# Patient Record
Sex: FEMALE | Race: BLACK OR AFRICAN AMERICAN | Employment: FULL TIME | URBAN - METROPOLITAN AREA
[De-identification: names, ages, dates, MRNs, and addresses within clinical notes are randomized per-mention and may not be internally consistent; named-entity substitution may affect disease eponyms.]

---

## 2017-02-03 ENCOUNTER — OFFICE VISIT (OUTPATIENT)
Dept: FAMILY MEDICINE CLINIC | Age: 52
End: 2017-02-03

## 2017-02-03 VITALS
RESPIRATION RATE: 18 BRPM | WEIGHT: 198.8 LBS | HEART RATE: 88 BPM | TEMPERATURE: 97.9 F | DIASTOLIC BLOOD PRESSURE: 70 MMHG | SYSTOLIC BLOOD PRESSURE: 110 MMHG | BODY MASS INDEX: 31.2 KG/M2 | HEIGHT: 67 IN

## 2017-02-03 DIAGNOSIS — J01.40 ACUTE NON-RECURRENT PANSINUSITIS: Primary | ICD-10-CM

## 2017-02-03 DIAGNOSIS — R05.9 COUGH: ICD-10-CM

## 2017-02-03 DIAGNOSIS — R60.9 PERIPHERAL EDEMA: ICD-10-CM

## 2017-02-03 PROCEDURE — 3017F COLORECTAL CA SCREEN DOC REV: CPT | Performed by: NURSE PRACTITIONER

## 2017-02-03 PROCEDURE — G8419 CALC BMI OUT NRM PARAM NOF/U: HCPCS | Performed by: NURSE PRACTITIONER

## 2017-02-03 PROCEDURE — G8427 DOCREV CUR MEDS BY ELIG CLIN: HCPCS | Performed by: NURSE PRACTITIONER

## 2017-02-03 PROCEDURE — 3014F SCREEN MAMMO DOC REV: CPT | Performed by: NURSE PRACTITIONER

## 2017-02-03 PROCEDURE — G8484 FLU IMMUNIZE NO ADMIN: HCPCS | Performed by: NURSE PRACTITIONER

## 2017-02-03 PROCEDURE — 99213 OFFICE O/P EST LOW 20 MIN: CPT | Performed by: NURSE PRACTITIONER

## 2017-02-03 PROCEDURE — 1036F TOBACCO NON-USER: CPT | Performed by: NURSE PRACTITIONER

## 2017-02-03 RX ORDER — CEFDINIR 300 MG/1
300 CAPSULE ORAL 2 TIMES DAILY
Qty: 20 CAPSULE | Refills: 0 | Status: SHIPPED | OUTPATIENT
Start: 2017-02-03 | End: 2017-02-13

## 2017-02-03 RX ORDER — BENZONATATE 100 MG/1
100 CAPSULE ORAL 3 TIMES DAILY PRN
Qty: 21 CAPSULE | Refills: 0 | Status: SHIPPED | OUTPATIENT
Start: 2017-02-03 | End: 2017-02-10

## 2017-02-03 ASSESSMENT — ENCOUNTER SYMPTOMS
SINUS PRESSURE: 1
ABDOMINAL DISTENTION: 0
SORE THROAT: 1
TROUBLE SWALLOWING: 0
SHORTNESS OF BREATH: 0
CHEST TIGHTNESS: 1
ANAL BLEEDING: 0
ABDOMINAL PAIN: 0
CONSTIPATION: 0
PHOTOPHOBIA: 0
BACK PAIN: 0
WHEEZING: 0
NAUSEA: 0
VOICE CHANGE: 1
COUGH: 1
DIARRHEA: 0
VOMITING: 0
APNEA: 0
BLOOD IN STOOL: 0

## 2017-02-03 ASSESSMENT — PATIENT HEALTH QUESTIONNAIRE - PHQ9
SUM OF ALL RESPONSES TO PHQ9 QUESTIONS 1 & 2: 1
1. LITTLE INTEREST OR PLEASURE IN DOING THINGS: 0
2. FEELING DOWN, DEPRESSED OR HOPELESS: 1
SUM OF ALL RESPONSES TO PHQ QUESTIONS 1-9: 1

## 2017-08-16 ENCOUNTER — TELEPHONE (OUTPATIENT)
Dept: FAMILY MEDICINE CLINIC | Age: 52
End: 2017-08-16

## 2017-08-16 DIAGNOSIS — Z12.31 ENCOUNTER FOR SCREENING MAMMOGRAM FOR MALIGNANT NEOPLASM OF BREAST: Primary | ICD-10-CM

## 2017-09-15 ENCOUNTER — HOSPITAL ENCOUNTER (OUTPATIENT)
Dept: WOMENS IMAGING | Age: 52
Discharge: HOME | End: 2017-09-15
Payer: COMMERCIAL

## 2017-09-15 DIAGNOSIS — Z12.31 ENCOUNTER FOR SCREENING MAMMOGRAM FOR MALIGNANT NEOPLASM OF BREAST: ICD-10-CM

## 2017-09-15 PROCEDURE — 77063 BREAST TOMOSYNTHESIS BI: CPT

## 2017-09-20 ENCOUNTER — HOSPITAL ENCOUNTER (OUTPATIENT)
Dept: WOMENS IMAGING | Age: 52
Discharge: HOME | End: 2017-09-20
Payer: COMMERCIAL

## 2017-09-20 DIAGNOSIS — R92.2 BREAST DENSITY: ICD-10-CM

## 2017-09-20 PROCEDURE — 76642 ULTRASOUND BREAST LIMITED: CPT

## 2017-09-22 ENCOUNTER — OFFICE VISIT (OUTPATIENT)
Dept: FAMILY MEDICINE CLINIC | Age: 52
End: 2017-09-22
Payer: COMMERCIAL

## 2017-09-22 VITALS
SYSTOLIC BLOOD PRESSURE: 122 MMHG | WEIGHT: 191.4 LBS | BODY MASS INDEX: 30.04 KG/M2 | HEART RATE: 84 BPM | RESPIRATION RATE: 18 BRPM | TEMPERATURE: 98.4 F | HEIGHT: 67 IN | DIASTOLIC BLOOD PRESSURE: 86 MMHG

## 2017-09-22 DIAGNOSIS — J01.40 ACUTE NON-RECURRENT PANSINUSITIS: Primary | ICD-10-CM

## 2017-09-22 DIAGNOSIS — J02.9 SORE THROAT: ICD-10-CM

## 2017-09-22 LAB — S PYO AG THROAT QL: NORMAL

## 2017-09-22 PROCEDURE — G8417 CALC BMI ABV UP PARAM F/U: HCPCS | Performed by: NURSE PRACTITIONER

## 2017-09-22 PROCEDURE — 1036F TOBACCO NON-USER: CPT | Performed by: NURSE PRACTITIONER

## 2017-09-22 PROCEDURE — 3014F SCREEN MAMMO DOC REV: CPT | Performed by: NURSE PRACTITIONER

## 2017-09-22 PROCEDURE — 87880 STREP A ASSAY W/OPTIC: CPT | Performed by: NURSE PRACTITIONER

## 2017-09-22 PROCEDURE — 3017F COLORECTAL CA SCREEN DOC REV: CPT | Performed by: NURSE PRACTITIONER

## 2017-09-22 PROCEDURE — 99213 OFFICE O/P EST LOW 20 MIN: CPT | Performed by: NURSE PRACTITIONER

## 2017-09-22 PROCEDURE — G8427 DOCREV CUR MEDS BY ELIG CLIN: HCPCS | Performed by: NURSE PRACTITIONER

## 2017-09-22 RX ORDER — CEFDINIR 300 MG/1
300 CAPSULE ORAL 2 TIMES DAILY
Qty: 28 CAPSULE | Refills: 0 | Status: SHIPPED | OUTPATIENT
Start: 2017-09-22 | End: 2017-10-06

## 2017-09-22 ASSESSMENT — ENCOUNTER SYMPTOMS
BLOOD IN STOOL: 0
COUGH: 1
ANAL BLEEDING: 0
PHOTOPHOBIA: 0
CHEST TIGHTNESS: 1
ABDOMINAL DISTENTION: 0
TROUBLE SWALLOWING: 0
APNEA: 0
WHEEZING: 0
CONSTIPATION: 0
NAUSEA: 0
VOMITING: 0
SHORTNESS OF BREATH: 0
VOICE CHANGE: 1
ABDOMINAL PAIN: 0
BACK PAIN: 0
RHINORRHEA: 0
DIARRHEA: 0
SORE THROAT: 1

## 2017-10-06 ENCOUNTER — HOSPITAL ENCOUNTER (OUTPATIENT)
Dept: WOMENS IMAGING | Age: 52
Discharge: HOME | End: 2017-10-06
Payer: COMMERCIAL

## 2017-10-06 DIAGNOSIS — N60.02 BREAST CYST, LEFT: ICD-10-CM

## 2017-10-06 PROCEDURE — 76942 ECHO GUIDE FOR BIOPSY: CPT

## 2017-10-06 NOTE — PROGRESS NOTES
Formulation and discussion of sedation / procedure plans, risks, benefits, side effects and alternatives with patient and/or responsible adult completed.     Electronically signed by Job Marrero MD on 10/6/2017 at 2:49 PM

## 2018-10-30 ENCOUNTER — TELEPHONE (OUTPATIENT)
Dept: FAMILY MEDICINE CLINIC | Age: 53
End: 2018-10-30

## 2018-10-30 DIAGNOSIS — M79.621 AXILLARY TENDERNESS, RIGHT: ICD-10-CM

## 2018-10-30 DIAGNOSIS — Z12.31 ENCOUNTER FOR SCREENING MAMMOGRAM FOR BREAST CANCER: Primary | ICD-10-CM

## 2018-11-13 DIAGNOSIS — M79.621 AXILLARY TENDERNESS, RIGHT: Primary | ICD-10-CM

## 2018-11-19 ENCOUNTER — HOSPITAL ENCOUNTER (OUTPATIENT)
Dept: WOMENS IMAGING | Age: 53
Discharge: HOME | End: 2018-11-19
Payer: COMMERCIAL

## 2018-11-19 DIAGNOSIS — Z12.31 ENCOUNTER FOR SCREENING MAMMOGRAM FOR BREAST CANCER: ICD-10-CM

## 2018-11-19 DIAGNOSIS — N64.4 BREAST PAIN: ICD-10-CM

## 2018-11-19 DIAGNOSIS — M79.621 AXILLARY TENDERNESS, RIGHT: ICD-10-CM

## 2018-11-19 PROCEDURE — 76642 ULTRASOUND BREAST LIMITED: CPT

## 2018-11-19 PROCEDURE — G0279 TOMOSYNTHESIS, MAMMO: HCPCS

## 2019-03-14 ENCOUNTER — TELEPHONE (OUTPATIENT)
Dept: FAMILY MEDICINE CLINIC | Age: 54
End: 2019-03-14

## 2019-03-15 ENCOUNTER — OFFICE VISIT (OUTPATIENT)
Dept: FAMILY MEDICINE CLINIC | Age: 54
End: 2019-03-15
Payer: COMMERCIAL

## 2019-03-15 VITALS
WEIGHT: 199.2 LBS | DIASTOLIC BLOOD PRESSURE: 90 MMHG | TEMPERATURE: 98 F | BODY MASS INDEX: 31.67 KG/M2 | RESPIRATION RATE: 16 BRPM | HEART RATE: 76 BPM | SYSTOLIC BLOOD PRESSURE: 136 MMHG

## 2019-03-15 DIAGNOSIS — B96.89 ACUTE BACTERIAL SINUSITIS: Primary | ICD-10-CM

## 2019-03-15 DIAGNOSIS — R05.9 COUGH: ICD-10-CM

## 2019-03-15 DIAGNOSIS — J01.90 ACUTE BACTERIAL SINUSITIS: Primary | ICD-10-CM

## 2019-03-15 PROCEDURE — 3017F COLORECTAL CA SCREEN DOC REV: CPT | Performed by: NURSE PRACTITIONER

## 2019-03-15 PROCEDURE — G8484 FLU IMMUNIZE NO ADMIN: HCPCS | Performed by: NURSE PRACTITIONER

## 2019-03-15 PROCEDURE — 1036F TOBACCO NON-USER: CPT | Performed by: NURSE PRACTITIONER

## 2019-03-15 PROCEDURE — 99213 OFFICE O/P EST LOW 20 MIN: CPT | Performed by: NURSE PRACTITIONER

## 2019-03-15 PROCEDURE — G8417 CALC BMI ABV UP PARAM F/U: HCPCS | Performed by: NURSE PRACTITIONER

## 2019-03-15 PROCEDURE — G8427 DOCREV CUR MEDS BY ELIG CLIN: HCPCS | Performed by: NURSE PRACTITIONER

## 2019-03-15 RX ORDER — CEFDINIR 300 MG/1
300 CAPSULE ORAL 2 TIMES DAILY
Qty: 20 CAPSULE | Refills: 0 | Status: SHIPPED | OUTPATIENT
Start: 2019-03-15 | End: 2019-03-25

## 2019-03-15 RX ORDER — BENZONATATE 100 MG/1
100 CAPSULE ORAL 3 TIMES DAILY PRN
Qty: 30 CAPSULE | Refills: 0 | Status: SHIPPED | OUTPATIENT
Start: 2019-03-15 | End: 2019-03-22

## 2019-03-15 ASSESSMENT — ENCOUNTER SYMPTOMS
COUGH: 1
WHEEZING: 0
SHORTNESS OF BREATH: 0
TROUBLE SWALLOWING: 0
RHINORRHEA: 1
EYE PAIN: 0
SINUS PRESSURE: 1
SORE THROAT: 1
SINUS PAIN: 1

## 2019-03-15 ASSESSMENT — PATIENT HEALTH QUESTIONNAIRE - PHQ9
SUM OF ALL RESPONSES TO PHQ QUESTIONS 1-9: 1
SUM OF ALL RESPONSES TO PHQ9 QUESTIONS 1 & 2: 1
SUM OF ALL RESPONSES TO PHQ QUESTIONS 1-9: 1
1. LITTLE INTEREST OR PLEASURE IN DOING THINGS: 0
2. FEELING DOWN, DEPRESSED OR HOPELESS: 1

## 2019-07-30 ENCOUNTER — HOSPITAL ENCOUNTER (OUTPATIENT)
Dept: WOMENS IMAGING | Age: 54
Discharge: HOME | End: 2019-07-30
Payer: COMMERCIAL

## 2019-07-30 DIAGNOSIS — Z09 FOLLOW UP: ICD-10-CM

## 2019-07-30 DIAGNOSIS — R59.0 ENLARGED LYMPH NODES IN ARMPIT: ICD-10-CM

## 2019-07-30 PROCEDURE — 76882 US LMTD JT/FCL EVL NVASC XTR: CPT

## 2019-12-30 ENCOUNTER — OFFICE VISIT (OUTPATIENT)
Dept: FAMILY MEDICINE CLINIC | Age: 54
End: 2019-12-30
Payer: COMMERCIAL

## 2019-12-30 VITALS
BODY MASS INDEX: 32.24 KG/M2 | DIASTOLIC BLOOD PRESSURE: 76 MMHG | HEIGHT: 66 IN | WEIGHT: 200.6 LBS | TEMPERATURE: 98.6 F | HEART RATE: 72 BPM | RESPIRATION RATE: 16 BRPM | SYSTOLIC BLOOD PRESSURE: 112 MMHG

## 2019-12-30 DIAGNOSIS — Z13.1 SCREENING FOR DIABETES MELLITUS: ICD-10-CM

## 2019-12-30 DIAGNOSIS — Z13.220 SCREENING, LIPID: ICD-10-CM

## 2019-12-30 DIAGNOSIS — R53.83 FATIGUE, UNSPECIFIED TYPE: ICD-10-CM

## 2019-12-30 DIAGNOSIS — L30.9 DERMATITIS: Primary | ICD-10-CM

## 2019-12-30 PROCEDURE — G8484 FLU IMMUNIZE NO ADMIN: HCPCS | Performed by: NURSE PRACTITIONER

## 2019-12-30 PROCEDURE — G8427 DOCREV CUR MEDS BY ELIG CLIN: HCPCS | Performed by: NURSE PRACTITIONER

## 2019-12-30 PROCEDURE — 1036F TOBACCO NON-USER: CPT | Performed by: NURSE PRACTITIONER

## 2019-12-30 PROCEDURE — G8417 CALC BMI ABV UP PARAM F/U: HCPCS | Performed by: NURSE PRACTITIONER

## 2019-12-30 PROCEDURE — 99213 OFFICE O/P EST LOW 20 MIN: CPT | Performed by: NURSE PRACTITIONER

## 2019-12-30 PROCEDURE — 3017F COLORECTAL CA SCREEN DOC REV: CPT | Performed by: NURSE PRACTITIONER

## 2019-12-30 RX ORDER — PREDNISONE 10 MG/1
TABLET ORAL
Qty: 30 TABLET | Refills: 0 | Status: SHIPPED | OUTPATIENT
Start: 2019-12-30 | End: 2020-01-09

## 2019-12-30 ASSESSMENT — ENCOUNTER SYMPTOMS
CONSTIPATION: 0
NAUSEA: 0
DIARRHEA: 0
BLOOD IN STOOL: 0
SHORTNESS OF BREATH: 0
VOMITING: 0

## 2020-06-23 ENCOUNTER — OFFICE VISIT (OUTPATIENT)
Dept: FAMILY MEDICINE CLINIC | Age: 55
End: 2020-06-23
Payer: COMMERCIAL

## 2020-06-23 VITALS
DIASTOLIC BLOOD PRESSURE: 86 MMHG | RESPIRATION RATE: 16 BRPM | BODY MASS INDEX: 33.09 KG/M2 | HEART RATE: 76 BPM | TEMPERATURE: 97.8 F | WEIGHT: 205 LBS | SYSTOLIC BLOOD PRESSURE: 146 MMHG

## 2020-06-23 PROCEDURE — G8427 DOCREV CUR MEDS BY ELIG CLIN: HCPCS | Performed by: NURSE PRACTITIONER

## 2020-06-23 PROCEDURE — 3017F COLORECTAL CA SCREEN DOC REV: CPT | Performed by: NURSE PRACTITIONER

## 2020-06-23 PROCEDURE — G8417 CALC BMI ABV UP PARAM F/U: HCPCS | Performed by: NURSE PRACTITIONER

## 2020-06-23 PROCEDURE — 99214 OFFICE O/P EST MOD 30 MIN: CPT | Performed by: NURSE PRACTITIONER

## 2020-06-23 PROCEDURE — 1036F TOBACCO NON-USER: CPT | Performed by: NURSE PRACTITIONER

## 2020-06-23 RX ORDER — FLUCONAZOLE 150 MG/1
TABLET ORAL
Qty: 2 TABLET | Refills: 0 | Status: SHIPPED | OUTPATIENT
Start: 2020-06-23 | End: 2020-06-24

## 2020-06-23 RX ORDER — AZITHROMYCIN 250 MG/1
250 TABLET, FILM COATED ORAL SEE ADMIN INSTRUCTIONS
Qty: 6 TABLET | Refills: 0 | Status: SHIPPED | OUTPATIENT
Start: 2020-06-23 | End: 2020-06-28

## 2020-06-23 ASSESSMENT — ENCOUNTER SYMPTOMS
COUGH: 1
SHORTNESS OF BREATH: 0
HOARSE VOICE: 1
RHINORRHEA: 1
SWOLLEN GLANDS: 0
SINUS PRESSURE: 1
TROUBLE SWALLOWING: 0
SORE THROAT: 0
WHEEZING: 0

## 2020-06-23 NOTE — PROGRESS NOTES
tablet, Rfl: 0    aspirin-acetaminophen-caffeine (EXCEDRIN MIGRAINE) 250-250-65 MG per tablet, Take 1 tablet by mouth every 6 hours as needed for Headaches, Disp: , Rfl:     acetaminophen 650 MG TABS, Take 650 mg by mouth every 4 hours as needed for Pain (For mild pain level 1-3 or for fever > 100.5). , Disp: 120 tablet, Rfl:     The patient is allergic to augmentin [amoxicillin-pot clavulanate] and sulfamethoxazole-trimethoprim. Past Medical History  Bozena Miller  has a past medical history of Allergic rhinitis, Anxiety, Migraine, and Root canal space perforation. Subjective:      Review of Systems   Constitutional: Negative for chills, diaphoresis and fever. HENT: Positive for congestion, hoarse voice, postnasal drip, rhinorrhea, sinus pressure and sneezing. Negative for sore throat and trouble swallowing. Respiratory: Positive for cough. Negative for shortness of breath and wheezing. Cardiovascular: Negative for chest pain and palpitations. Genitourinary: Negative for vaginal bleeding, vaginal discharge and vaginal pain. Musculoskeletal: Negative for neck pain. Neurological: Negative for dizziness and headaches. Objective:     BP (!) 146/86   Pulse 76   Temp 97.8 °F (36.6 °C)   Resp 16   Wt 205 lb (93 kg)   BMI 33.09 kg/m²     Physical Exam  Vitals signs reviewed. Constitutional:       General: She is not in acute distress. Appearance: Normal appearance. She is well-developed. HENT:      Head: Normocephalic and atraumatic. Right Ear: Hearing, tympanic membrane, ear canal and external ear normal.      Left Ear: Hearing, tympanic membrane, ear canal and external ear normal.      Nose: Congestion and rhinorrhea present. No nasal tenderness. Right Sinus: Maxillary sinus tenderness and frontal sinus tenderness present. Left Sinus: Maxillary sinus tenderness and frontal sinus tenderness present. Mouth/Throat:      Lips: Pink.       Mouth: Mucous membranes are moist. No oral lesions. Pharynx: Oropharynx is clear. Uvula midline. Eyes:      General:         Right eye: No discharge. Left eye: No discharge. Conjunctiva/sclera: Conjunctivae normal.   Neck:      Musculoskeletal: Full passive range of motion without pain, normal range of motion and neck supple. Trachea: No tracheal deviation. Cardiovascular:      Rate and Rhythm: Normal rate and regular rhythm. Pulses: Normal pulses. Heart sounds: Normal heart sounds. No murmur. Pulmonary:      Effort: Pulmonary effort is normal. No respiratory distress. Breath sounds: Normal breath sounds. Abdominal:      General: Bowel sounds are normal.      Palpations: Abdomen is soft. Tenderness: There is no abdominal tenderness. Musculoskeletal: Normal range of motion. Lymphadenopathy:      Head:      Right side of head: No submental, submandibular, tonsillar, preauricular, posterior auricular or occipital adenopathy. Left side of head: No submental, submandibular, tonsillar, preauricular, posterior auricular or occipital adenopathy. Cervical: No cervical adenopathy. Skin:     General: Skin is warm and dry. Findings: No rash. Neurological:      General: No focal deficit present. Mental Status: She is alert and oriented to person, place, and time. Coordination: Coordination normal.   Psychiatric:         Behavior: Behavior normal.         Thought Content: Thought content normal.         Judgment: Judgment normal.         Assessment/Plan:      Solange Manzo was seen today for sinusitis and medication refill. Diagnoses and all orders for this visit:    Bronchitis  -     azithromycin (ZITHROMAX) 250 MG tablet; Take 1 tablet by mouth See Admin Instructions for 5 days 500mg on day 1 followed by 250mg on days 2 - 5    Other orders  -     triamcinolone (KENALOG) 0.1 % ointment;  Apply thin layer to affected area twice daily as needed for itching  -     fluconazole (DIFLUCAN) 150 MG tablet; Take 1 pill for 1 day, may repeat in 3 days if needed. - Rest and increase fluids  - Start a daily allergy medication, Xyzal samples provided. - Will do a round of diflucan since pt is going on antibiotics. Pt to follow up next month for PAP and pelvic exam that are due. Follow up sooner if vaginal irritation continue. Pt was agreeable to this plan. - Call office with any questions or concerns, or if symptoms are getting worse or changing      Return if symptoms worsen or fail to improve. Patient given educational materials - see patient instructions. Discussed use, benefit, and side effects of prescribed medications. All patient questions answered. Pt voiced understanding.         Electronically signed by CE Sims CNP on 6/23/2020 at 12:37 PM

## 2020-07-28 ENCOUNTER — OFFICE VISIT (OUTPATIENT)
Dept: FAMILY MEDICINE CLINIC | Age: 55
End: 2020-07-28
Payer: COMMERCIAL

## 2020-07-28 VITALS
SYSTOLIC BLOOD PRESSURE: 124 MMHG | RESPIRATION RATE: 12 BRPM | TEMPERATURE: 97 F | HEART RATE: 68 BPM | WEIGHT: 206.2 LBS | HEIGHT: 66 IN | DIASTOLIC BLOOD PRESSURE: 72 MMHG | BODY MASS INDEX: 33.14 KG/M2

## 2020-07-28 PROCEDURE — 99396 PREV VISIT EST AGE 40-64: CPT | Performed by: FAMILY MEDICINE

## 2020-07-28 SDOH — ECONOMIC STABILITY: TRANSPORTATION INSECURITY
IN THE PAST 12 MONTHS, HAS LACK OF TRANSPORTATION KEPT YOU FROM MEETINGS, WORK, OR FROM GETTING THINGS NEEDED FOR DAILY LIVING?: NO

## 2020-07-28 SDOH — ECONOMIC STABILITY: FOOD INSECURITY: WITHIN THE PAST 12 MONTHS, THE FOOD YOU BOUGHT JUST DIDN'T LAST AND YOU DIDN'T HAVE MONEY TO GET MORE.: NEVER TRUE

## 2020-07-28 SDOH — ECONOMIC STABILITY: FOOD INSECURITY: WITHIN THE PAST 12 MONTHS, YOU WORRIED THAT YOUR FOOD WOULD RUN OUT BEFORE YOU GOT MONEY TO BUY MORE.: NEVER TRUE

## 2020-07-28 SDOH — ECONOMIC STABILITY: TRANSPORTATION INSECURITY
IN THE PAST 12 MONTHS, HAS THE LACK OF TRANSPORTATION KEPT YOU FROM MEDICAL APPOINTMENTS OR FROM GETTING MEDICATIONS?: NO

## 2020-07-28 SDOH — ECONOMIC STABILITY: INCOME INSECURITY: HOW HARD IS IT FOR YOU TO PAY FOR THE VERY BASICS LIKE FOOD, HOUSING, MEDICAL CARE, AND HEATING?: NOT HARD AT ALL

## 2020-07-28 ASSESSMENT — PATIENT HEALTH QUESTIONNAIRE - PHQ9
2. FEELING DOWN, DEPRESSED OR HOPELESS: 0
SUM OF ALL RESPONSES TO PHQ9 QUESTIONS 1 & 2: 1
SUM OF ALL RESPONSES TO PHQ QUESTIONS 1-9: 1
SUM OF ALL RESPONSES TO PHQ QUESTIONS 1-9: 1
1. LITTLE INTEREST OR PLEASURE IN DOING THINGS: 1

## 2020-07-28 ASSESSMENT — ENCOUNTER SYMPTOMS
SHORTNESS OF BREATH: 0
DIARRHEA: 0
EYES NEGATIVE: 1
NAUSEA: 0
ABDOMINAL PAIN: 0
VOMITING: 0
BLOOD IN STOOL: 0

## 2020-07-28 NOTE — PROGRESS NOTES
Chief Complaint   Patient presents with    Annual Exam       SUBJECTIVE     Earle Staggers is a 54 y. o.female    Pt presents for annual wellness physical exam and pap testing. Denies vaginal bleeding or spotting. No h/o abnormal pap. Due for mammogram.  Not currently exercising. No changes in family history. Nonsmoker. Body mass index is 33.28 kg/m². Review of Systems   Constitutional: Negative for chills, fatigue and fever. HENT: Negative. Eyes: Negative. Respiratory: Negative for shortness of breath. Cardiovascular: Negative for chest pain, palpitations and leg swelling. Gastrointestinal: Negative for abdominal pain, blood in stool, diarrhea, nausea and vomiting. Genitourinary: Negative for dysuria, vaginal bleeding and vaginal discharge. Musculoskeletal: Negative for arthralgias and myalgias. Skin: Negative for rash. Neurological: Negative for dizziness and headaches. Hematological: Negative for adenopathy. Psychiatric/Behavioral: Negative. All other systems reviewed and are negative. OBJECTIVE     /72 (Site: Left Upper Arm, Position: Sitting, Cuff Size: Medium Adult)   Pulse 68   Temp 97 °F (36.1 °C) (Temporal)   Resp 12   Ht 5' 6\" (1.676 m)   Wt 206 lb 3.2 oz (93.5 kg)   BMI 33.28 kg/m²     Wt Readings from Last 3 Encounters:   07/28/20 206 lb 3.2 oz (93.5 kg)   06/23/20 205 lb (93 kg)   12/30/19 200 lb 9.6 oz (91 kg)       Physical Exam  Vitals signs and nursing note reviewed. Constitutional:       General: She is not in acute distress. Appearance: She is well-developed. HENT:      Head: Normocephalic and atraumatic. Right Ear: Tympanic membrane normal.      Left Ear: Tympanic membrane normal.      Mouth/Throat:      Mouth: Mucous membranes are moist.      Pharynx: No posterior oropharyngeal erythema. Eyes:      Conjunctiva/sclera: Conjunctivae normal.   Neck:      Musculoskeletal: Neck supple. Thyroid: No thyromegaly. Vascular: No carotid bruit. Cardiovascular:      Rate and Rhythm: Normal rate and regular rhythm. Heart sounds: No murmur. Pulmonary:      Effort: Pulmonary effort is normal.      Breath sounds: Normal breath sounds. No wheezing. Abdominal:      General: Bowel sounds are normal.      Palpations: Abdomen is soft. Tenderness: There is no abdominal tenderness. There is no guarding or rebound. Genitourinary:     General: Normal vulva. Vagina: No vaginal discharge. Rectum: Normal.      Comments: External genitalia normal.  Spec exam reveals parous cervical os without lesion. Pap  Collected. Bimanual exam within normal limits. Breasts are of normal shape and contour. No masses or lumps palpated. No axillary or supraclavicular LA noted. Musculoskeletal:      Right lower leg: No edema. Left lower leg: No edema. Lymphadenopathy:      Cervical: No cervical adenopathy. Skin:     General: Skin is warm and dry. Findings: No rash. Neurological:      Mental Status: She is alert and oriented to person, place, and time.    Psychiatric:         Behavior: Behavior normal.             Immunization History   Administered Date(s) Administered    Influenza 09/27/2013    Influenza Virus Vaccine 02/15/2013, 10/01/2014, 10/06/2015    Influenza, [place] Generous, IM, PF (6 mo and older Fluzone, Flulaval, Fluarix, and 3 yrs and older Afluria) 10/10/2018, 12/19/2019    Influenza, Triv, 3 Years and older, IM (Afluria (5 yrs and older) 10/20/2016    Tdap (Boostrix, Adacel) 06/25/2015         Health Maintenance   Topic Date Due    Hepatitis C screen  1965    HIV screen  07/19/1980    Diabetes screen  07/19/2005    Shingles Vaccine (1 of 2) 07/19/2015    Cervical cancer screen  08/10/2018    Breast cancer screen  11/19/2019    Lipid screen  08/07/2020    Flu vaccine (1) 09/01/2020    DTaP/Tdap/Td vaccine (2 - Td) 06/25/2025    Colon cancer screen colonoscopy  03/28/2026    Hepatitis A vaccine  Aged Out    Hepatitis B vaccine  Aged Out    Hib vaccine  Aged Out    Meningococcal (ACWY) vaccine  Aged Out    Pneumococcal 0-64 years Vaccine  Aged Out         ASSESSMENT      1. Encounter for gynecological examination without abnormal finding    2. Encounter for screening mammogram for malignant neoplasm of breast        PLAN      Pap to lab    Update mammogram    Healthy diet and exercise suggested for overall health    Orders Placed This Encounter   Procedures    BO BRETT DIGITAL SCREEN BILATERAL     Standing Status:   Future     Standing Expiration Date:   9/27/2021     Order Specific Question:   Reason for exam:     Answer:   screening    PAP SMEAR     Patient History:    No LMP recorded. OBGYN Status: Having periods  Past Surgical History:  No date: MOUTH SURGERY      Comment:  2 root canals, wisdom teeth      Social History    Tobacco Use      Smoking status: Never Smoker      Smokeless tobacco: Never Used       Order Specific Question:   Collection Type     Answer: Thin Prep     Order Specific Question:   Prior Abnormal Pap Test     Answer:   No     Order Specific Question:   Screening or Diagnostic     Answer:   Screening     Order Specific Question:   HPV Requested?      Answer:   Yes -  If ASCUS Reflex HPV     Order Specific Question:   High Risk Patient     Answer:   N/A           [facility] received counseling on the following healthy behaviors: nutrition and exercise    Follow up yearly and prn        Electronically signed by Hussain Willett MD on 7/28/2020 at 11:13 AM

## 2020-08-06 ENCOUNTER — HOSPITAL ENCOUNTER (OUTPATIENT)
Dept: WOMENS IMAGING | Age: 55
Discharge: HOME | End: 2020-08-06

## 2020-08-06 LAB — CYTOLOGY THIN PREP PAP: NORMAL

## 2020-08-06 PROCEDURE — 77067 SCR MAMMO BI INCL CAD: CPT

## 2020-10-21 ENCOUNTER — OFFICE VISIT (OUTPATIENT)
Dept: FAMILY MEDICINE CLINIC | Age: 55
End: 2020-10-21
Payer: COMMERCIAL

## 2020-10-21 VITALS
TEMPERATURE: 97 F | WEIGHT: 202.6 LBS | SYSTOLIC BLOOD PRESSURE: 144 MMHG | BODY MASS INDEX: 32.7 KG/M2 | HEART RATE: 68 BPM | DIASTOLIC BLOOD PRESSURE: 98 MMHG | RESPIRATION RATE: 16 BRPM

## 2020-10-21 PROCEDURE — G8484 FLU IMMUNIZE NO ADMIN: HCPCS | Performed by: FAMILY MEDICINE

## 2020-10-21 PROCEDURE — 99213 OFFICE O/P EST LOW 20 MIN: CPT | Performed by: FAMILY MEDICINE

## 2020-10-21 PROCEDURE — G8417 CALC BMI ABV UP PARAM F/U: HCPCS | Performed by: FAMILY MEDICINE

## 2020-10-21 PROCEDURE — 1036F TOBACCO NON-USER: CPT | Performed by: FAMILY MEDICINE

## 2020-10-21 PROCEDURE — 3017F COLORECTAL CA SCREEN DOC REV: CPT | Performed by: FAMILY MEDICINE

## 2020-10-21 PROCEDURE — G8427 DOCREV CUR MEDS BY ELIG CLIN: HCPCS | Performed by: FAMILY MEDICINE

## 2020-10-21 RX ORDER — LOSARTAN POTASSIUM 50 MG/1
50 TABLET ORAL DAILY
Qty: 30 TABLET | Refills: 5 | Status: SHIPPED | OUTPATIENT
Start: 2020-10-21 | End: 2020-11-12 | Stop reason: SDUPTHER

## 2020-10-21 ASSESSMENT — ENCOUNTER SYMPTOMS: GASTROINTESTINAL NEGATIVE: 1

## 2020-10-21 NOTE — LETTER
[address] [address]  Phone: [phone]  Fax: [phone]    Javad Zambrano MD        October 21, 2020     Patient: Radha Morejon   YOB: 1965   Date of Visit: 10/21/2020       To Whom It May Concern: It is my medical opinion that Ana Salazar may return to work on Friday, October 23, 2020. If you have any questions or concerns, please don't hesitate to call.     Sincerely,        Javad Zambrano MD

## 2020-10-21 NOTE — PATIENT INSTRUCTIONS
Patient Education        Low Sodium Diet (2,000 Milligram): Care Instructions  Your Care Instructions     Too much sodium causes your body to hold on to extra water. This can raise your blood pressure and force your heart and kidneys to work harder. In very serious cases, this could cause you to be put in the hospital. It might even be life-threatening. By limiting sodium, you will feel better and lower your risk of serious problems. The most common source of sodium is salt. People get most of the salt in their diet from canned, prepared, and packaged foods. Fast food and restaurant meals also are very high in sodium. Your doctor will probably limit your sodium to less than 2,000 milligrams (mg) a day. This limit counts all the sodium in prepared and packaged foods and any salt you add to your food. Follow-up care is a key part of your treatment and safety. Be sure to make and go to all appointments, and call your doctor if you are having problems. It's also a good idea to know your test results and keep a list of the medicines you take. How can you care for yourself at home? Read food labels  · Read labels on cans and food packages. The labels tell you how much sodium is in each serving. Make sure that you look at the serving size. If you eat more than the serving size, you have eaten more sodium. · Food labels also tell you the Percent Daily Value for sodium. Choose products with low Percent Daily Values for sodium. · Be aware that sodium can come in forms other than salt, including monosodium glutamate (MSG), sodium citrate, and sodium bicarbonate (baking soda). MSG is often added to Asian food. When you eat out, you can sometimes ask for food without MSG or added salt. Buy low-sodium foods  · Buy foods that are labeled \"unsalted\" (no salt added), \"sodium-free\" (less than 5 mg of sodium per serving), or \"low-sodium\" (less than 140 mg of sodium per serving).  Foods labeled \"reduced-sodium\" and \"light sodium\" may still have too much sodium. Be sure to read the label to see how much sodium you are getting. · Buy fresh vegetables, or frozen vegetables without added sauces. Buy low-sodium versions of canned vegetables, soups, and other canned goods. Prepare low-sodium meals  · Cut back on the amount of salt you use in cooking. This will help you adjust to the taste. Do not add salt after cooking. One teaspoon of salt has about 2,300 mg of sodium. · Take the salt shaker off the table. · Flavor your food with garlic, lemon juice, onion, vinegar, herbs, and spices. Do not use soy sauce, lite soy sauce, steak sauce, onion salt, garlic salt, celery salt, mustard, or ketchup on your food. · Use low-sodium salad dressings, sauces, and ketchup. Or make your own salad dressings and sauces without adding salt. · Use less salt (or none) when recipes call for it. You can often use half the salt a recipe calls for without losing flavor. Other foods such as rice, pasta, and grains do not need added salt. · Rinse canned vegetables, and cook them in fresh water. This removes some--but not all--of the salt. · Avoid water that is naturally high in sodium or that has been treated with water softeners, which add sodium. Call your local water company to find out the sodium content of your water supply. If you buy bottled water, read the label and choose a sodium-free brand. Avoid high-sodium foods  · Avoid eating:  ? Smoked, cured, salted, and canned meat, fish, and poultry. ? Ham, neely, hot dogs, and luncheon meats. ? Regular, hard, and processed cheese and regular peanut butter. ? Crackers with salted tops, and other salted snack foods such as pretzels, chips, and salted popcorn. ? Frozen prepared meals, unless labeled low-sodium. ? Canned and dried soups, broths, and bouillon, unless labeled sodium-free or low-sodium. ? Canned vegetables, unless labeled sodium-free or low-sodium. ?  [place] fries, pizza, tacos, and other fast foods. ? Pickles, olives, ketchup, and other condiments, especially soy sauce, unless labeled sodium-free or low-sodium. Where can you learn more? Go to https://Fly Mediastephanie.Yadio. org and sign in to your [employer] account. Enter E852 in the [facility] box to learn more about \"Low Sodium Diet (2,000 Milligram): Care Instructions. \"     If you do not have an account, please click on the \"Sign Up Now\" link. Current as of: August 22, 2019               Content Version: 12.6  © 1699-3566 [employer]. Care instructions adapted under license by [facility] ([facility]). If you have questions about a medical condition or this instruction, always ask your healthcare professional. [address] any warranty or liability for your use of this information. Patient Education        DASH Diet: Care Instructions  Your Care Instructions     The DASH diet is an eating plan that can help lower your blood pressure. DASH stands for Dietary Approaches to Stop Hypertension. Hypertension is high blood pressure. The DASH diet focuses on eating foods that are high in calcium, potassium, and magnesium. These nutrients can lower blood pressure. The foods that are highest in these nutrients are fruits, vegetables, low-fat dairy products, nuts, seeds, and legumes. But taking calcium, potassium, and magnesium supplements instead of eating foods that are high in those nutrients does not have the same effect. The DASH diet also includes whole grains, fish, and poultry. The DASH diet is one of several lifestyle changes your doctor may recommend to lower your high blood pressure. Your doctor may also want you to decrease the amount of sodium in your diet. Lowering sodium while following the DASH diet can lower blood pressure even further than just the DASH diet alone. Follow-up care is a key part of your treatment and safety.  Be sure to make and go to all appointments, and call with them. Once those changes become habit, add a few more changes. · Try some of the following:  ? Make it a goal to eat a fruit or vegetable at every meal and at snacks. This will make it easy to get the recommended amount of fruits and vegetables each day. ? Try yogurt topped with fruit and nuts for a snack or healthy dessert. ? Add lettuce, tomato, cucumber, and onion to sandwiches. ? Combine a ready-made pizza crust with low-fat mozzarella cheese and lots of vegetable toppings. Try using tomatoes, squash, spinach, broccoli, carrots, cauliflower, and onions. ? Have a variety of cut-up vegetables with a low-fat dip as an appetizer instead of chips and dip. ? Sprinkle sunflower seeds or chopped almonds over salads. Or try adding chopped walnuts or almonds to cooked vegetables. ? Try some vegetarian meals using beans and peas. Add garbanzo or kidney beans to salads. Make burritos and tacos with mashed martinez beans or black beans. Where can you learn more? Go to https://[employer].TeamVisibility. org and sign in to your [employer] account. Enter D058 in the [facility] box to learn more about \"DASH Diet: Care Instructions. \"     If you do not have an account, please click on the \"Sign Up Now\" link. Current as of: December 16, 2019               Content Version: 12.6  © 2754-6101 [employer]. Care instructions adapted under license by [facility] ([facility]). If you have questions about a medical condition or this instruction, always ask your healthcare professional. [place] [address] any warranty or liability for your use of this information.

## 2020-10-21 NOTE — PROGRESS NOTES
Chief Complaint   Patient presents with    Hypertension     Patient has been experiencing high blood pressure readings and headache.  Headache     Patient states that she has been having headaches off and on for the past 2 weeks but they are becoming more consistent.  Tinnitus     Patient has been experiencing this off and on for the past couple weeks. 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 SUBJECTIVE     Teresa Flannery is a 54 y. o.female      Pt complains of recent high blood pressures. She c/o intermittent headaches and tinnitus over the last couple weeks and she had the school nurse check her BP today. Her reading was 160s/100s. She states that she has been under more stress at school this year. Headaches are described as \"pounding\". No family h/o HTN. No major changes in her weight. Nonsmoker. Body mass index is 32.7 kg/m². Review of Systems   Constitutional: Negative for fever and unexpected weight change. HENT: Positive for tinnitus. Cardiovascular: Positive for palpitations (occasional ). Negative for chest pain. Gastrointestinal: Negative. Genitourinary: Negative. Neurological: Positive for headaches. Negative for dizziness. All other systems reviewed and are negative. OBJECTIVE     BP (!) 144/98 (Site: Right Upper Arm, Position: Sitting, Cuff Size: Medium Adult)   Pulse 68   Temp 97 °F (36.1 °C) (Temporal)   Resp 16   Wt 202 lb 9.6 oz (91.9 kg)   LMP 07/15/2019   BMI 32.70 kg/m²     Wt Readings from Last 3 Encounters:   10/21/20 202 lb 9.6 oz (91.9 kg)   07/28/20 206 lb 3.2 oz (93.5 kg)   06/23/20 205 lb (93 kg)     BP Readings from Last 3 Encounters:   10/21/20 (!) 144/98   07/28/20 124/72   06/23/20 (!) 146/86       Physical Exam  Vitals signs reviewed. Constitutional:       General: She is not in acute distress. Appearance: She is well-developed. HENT:      Head: Normocephalic and atraumatic. Right Ear: Tympanic membrane normal.      Left Ear: Tympanic membrane normal.      Mouth/Throat:      Mouth: Mucous membranes are moist.      Pharynx: No posterior oropharyngeal erythema. Eyes:      Conjunctiva/sclera: Conjunctivae normal.   Neck:      Vascular: No carotid bruit. Cardiovascular:      Rate and Rhythm: Normal rate and regular rhythm. Heart sounds: No murmur. Pulmonary:      Breath sounds: Normal breath sounds. No wheezing. Musculoskeletal:      Right lower leg: No edema. Left lower leg: No edema. Lymphadenopathy:      Cervical: No cervical adenopathy. Neurological:      Mental Status: She is alert. ASSESSMENT      1. Essential hypertension    2. Nonintractable episodic headache, unspecified headache type    3. Tinnitus, unspecified laterality        PLAN     Requested Prescriptions     Signed Prescriptions Disp Refills    losartan (COZAAR) 50 MG tablet 30 tablet 5     Sig: Take 1 tablet by mouth daily     Start losartan as above    Low sodium diet    Increase walking to reduce weight    Labs as below    Orders Placed This Encounter   Procedures    Lipid Panel     Standing Status:   Future     Standing Expiration Date:   10/21/2021     Order Specific Question:   Is Patient Fasting?/# of Hours     Answer:   12    Comprehensive Metabolic Panel     Standing Status:   Future     Standing Expiration Date:   10/21/2021    CBC Auto Differential     Standing Status:   Future     Standing Expiration Date:   10/22/2021    TSH without Reflex     Standing Status:   Future     Standing Expiration Date:   10/22/2021    T4, Free     Standing Status:   Future     Standing Expiration Date:   10/21/2021     I am hopeful that her tinnitus and headache may improve with reduction in her BP    Return in about 3 weeks (around 11/11/2020).         Electronically signed by Reena Stinson MD on 10/21/2020 at 12:24 PM                Electronically signed by Camron Williamson MD on 10/21/2020 at 12:20 PM

## 2020-10-22 LAB
ABSOLUTE BASO #: 0 X10E9/L (ref 0–0.2)
ABSOLUTE EOS #: 0 X10E9/L (ref 0–0.4)
ABSOLUTE LYMPH #: 0.9 X10E9/L (ref 1–3.5)
ABSOLUTE MONO #: 0.3 X10E9/L (ref 0–0.9)
ABSOLUTE NEUT #: 2.3 X10E9/L (ref 1.5–6.6)
ALBUMIN SERPL-MCNC: 3.9 G/DL (ref 3.2–5.3)
ALK PHOSPHATASE: 66 U/L (ref 39–130)
ALT SERPL-CCNC: 11 U/L (ref 0–31)
ANION GAP SERPL CALCULATED.3IONS-SCNC: 7 MMOL/L (ref 5–15)
AST SERPL-CCNC: 16 U/L (ref 0–41)
BASOPHILS RELATIVE PERCENT: 0.7 %
BILIRUB SERPL-MCNC: 0.4 MG/DL (ref 0.3–1.2)
BUN BLDV-MCNC: 16 MG/DL (ref 5–23)
CALCIUM SERPL-MCNC: 9.5 MG/DL (ref 8.5–10.5)
CHLORIDE BLD-SCNC: 108 MMOL/L (ref 98–109)
CHOLESTEROL/HDL RATIO: 2.6 (ref 1–5)
CHOLESTEROL: 161 MG/DL (ref 150–200)
CO2: 28 MMOL/L (ref 22–32)
CREAT SERPL-MCNC: 0.75 MG/DL (ref 0.4–1)
EGFR AFRICAN AMERICAN: >60 ML/MIN/1.73SQ.M
EGFR IF NONAFRICAN AMERICAN: >60 ML/MIN/1.73SQ.M
EOSINOPHILS RELATIVE PERCENT: 0 %
GLUCOSE: 83 MG/DL (ref 65–99)
HCT VFR BLD CALC: 38.9 % (ref 35–47)
HDLC SERPL-MCNC: 62 MG/DL
HEMOGLOBIN: 12.5 G/DL (ref 11.7–15.5)
LDL CHOLESTEROL CALCULATED: 82 MG/DL
LDL/HDL RATIO: 1.3
LYMPHOCYTE %: 24.8 %
MCH RBC QN AUTO: 26.4 PG (ref 27–34)
MCHC RBC AUTO-ENTMCNC: 32.1 G/DL (ref 32–36)
MCV RBC AUTO: 82 FL (ref 80–100)
MONOCYTES # BLD: 8.3 %
NEUTROPHILS RELATIVE PERCENT: 66.2 %
PDW BLD-RTO: 15.4 % (ref 11.5–15)
PLATELETS: 244 X10E9/L (ref 150–450)
PMV BLD AUTO: 8.6 FL (ref 7–12)
POTASSIUM SERPL-SCNC: 4.4 MMOL/L (ref 3.5–5)
RBC: 4.74 X10E12/L (ref 3.8–5.2)
SODIUM BLD-SCNC: 143 MMOL/L (ref 134–146)
T4 FREE: 0.91 NG/DL (ref 0.61–1.6)
TOTAL PROTEIN: 7.9 G/DL (ref 6–8)
TRIGL SERPL-MCNC: 87 MG/DL (ref 27–150)
TSH SERPL DL<=0.05 MIU/L-ACNC: 1.57 UIU/ML (ref 0.49–4.67)
VLDLC SERPL CALC-MCNC: 17 MG/DL (ref 0–30)
WBC: 3.5 X10E9/L (ref 4–11)

## 2020-11-11 PROBLEM — I10 ESSENTIAL HYPERTENSION: Status: ACTIVE | Noted: 2020-11-11

## 2020-11-12 ENCOUNTER — OFFICE VISIT (OUTPATIENT)
Dept: FAMILY MEDICINE CLINIC | Age: 55
End: 2020-11-12
Payer: COMMERCIAL

## 2020-11-12 VITALS
HEART RATE: 80 BPM | BODY MASS INDEX: 32.73 KG/M2 | SYSTOLIC BLOOD PRESSURE: 130 MMHG | WEIGHT: 202.8 LBS | RESPIRATION RATE: 16 BRPM | DIASTOLIC BLOOD PRESSURE: 90 MMHG | TEMPERATURE: 96.4 F

## 2020-11-12 PROBLEM — D72.819 CHRONIC LEUKOPENIA: Status: ACTIVE | Noted: 2020-11-12

## 2020-11-12 PROBLEM — E66.811 CLASS 1 OBESITY DUE TO EXCESS CALORIES WITH SERIOUS COMORBIDITY AND BODY MASS INDEX (BMI) OF 32.0 TO 32.9 IN ADULT: Chronic | Status: ACTIVE | Noted: 2020-11-12

## 2020-11-12 PROBLEM — E66.09 CLASS 1 OBESITY DUE TO EXCESS CALORIES WITH SERIOUS COMORBIDITY AND BODY MASS INDEX (BMI) OF 32.0 TO 32.9 IN ADULT: Status: ACTIVE | Noted: 2020-11-12

## 2020-11-12 PROBLEM — E66.811 CLASS 1 OBESITY DUE TO EXCESS CALORIES WITH SERIOUS COMORBIDITY AND BODY MASS INDEX (BMI) OF 32.0 TO 32.9 IN ADULT: Status: ACTIVE | Noted: 2020-11-12

## 2020-11-12 PROBLEM — E66.09 CLASS 1 OBESITY DUE TO EXCESS CALORIES WITH SERIOUS COMORBIDITY AND BODY MASS INDEX (BMI) OF 32.0 TO 32.9 IN ADULT: Chronic | Status: ACTIVE | Noted: 2020-11-12

## 2020-11-12 PROCEDURE — G8484 FLU IMMUNIZE NO ADMIN: HCPCS | Performed by: FAMILY MEDICINE

## 2020-11-12 PROCEDURE — 3017F COLORECTAL CA SCREEN DOC REV: CPT | Performed by: FAMILY MEDICINE

## 2020-11-12 PROCEDURE — G8417 CALC BMI ABV UP PARAM F/U: HCPCS | Performed by: FAMILY MEDICINE

## 2020-11-12 PROCEDURE — 99213 OFFICE O/P EST LOW 20 MIN: CPT | Performed by: FAMILY MEDICINE

## 2020-11-12 PROCEDURE — G8427 DOCREV CUR MEDS BY ELIG CLIN: HCPCS | Performed by: FAMILY MEDICINE

## 2020-11-12 PROCEDURE — 1036F TOBACCO NON-USER: CPT | Performed by: FAMILY MEDICINE

## 2020-11-12 RX ORDER — LOSARTAN POTASSIUM 100 MG/1
100 TABLET ORAL DAILY
Qty: 30 TABLET | Refills: 11 | Status: SHIPPED | OUTPATIENT
Start: 2020-11-12 | End: 2021-11-05

## 2020-11-12 ASSESSMENT — ENCOUNTER SYMPTOMS
GASTROINTESTINAL NEGATIVE: 1
COUGH: 0

## 2020-11-12 NOTE — PROGRESS NOTES
Chief Complaint   Patient presents with    Other     3 week F/U Pt is still getting headaches but not as bad as before other S/X are better         SUBJECTIVE     Luiz Campa is a 54 y. o.female      Pt here for follow up of HTN and headache. Her blood pressures are improved some but still not at goal.  Her headaches are less frequent and less severe since starting losartan. Takes all meds as directed and denies side effects. 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 Her labs look good. She is walking the dog more. Nonsmoker. Body mass index is 32.73 kg/m². Review of Systems   Constitutional: Negative for fatigue and fever. HENT: Negative. Respiratory: Negative for cough. Cardiovascular: Negative. Gastrointestinal: Negative. Musculoskeletal: Negative. Neurological: Positive for headaches (improved). All other systems reviewed and are negative. OBJECTIVE     BP (!) 130/90 (Site: Right Upper Arm)   Pulse 80   Temp 96.4 °F (35.8 °C) (Temporal)   Resp 16   Wt 202 lb 12.8 oz (92 kg)   LMP 07/15/2019   BMI 32.73 kg/m²     BP Readings from Last 3 Encounters:   11/12/20 (!) 130/90   10/21/20 (!) 144/98   07/28/20 124/72       Physical Exam  Vitals signs reviewed. Constitutional:       General: She is not in acute distress. Appearance: She is well-developed. HENT:      Head: Normocephalic and atraumatic. Right Ear: Tympanic membrane normal.      Left Ear: Tympanic membrane normal.      Mouth/Throat:      Mouth: Mucous membranes are moist.      Pharynx: No posterior oropharyngeal erythema. Eyes:      Conjunctiva/sclera: Conjunctivae normal.   Cardiovascular:      Rate and Rhythm: Normal rate and regular rhythm. Heart sounds: No murmur. Pulmonary:      Breath sounds: Normal breath sounds. No wheezing. Musculoskeletal:      Right lower leg: No edema. Left lower leg: No edema.    Lymphadenopathy:      Cervical: No cervical adenopathy. Neurological:      Mental Status: She is alert. Component      Latest Ref Rng & Units 10/21/2020   WBC      4.0 - 11.0 X10E9/L 3.5 (L)   RBC      3.80 - 5.20 X10E12/L 4.74   Hemoglobin Quant      11.7 - 15.5 g/dL 12.5   Hematocrit      35 - 47 % 38.9   MCV      80 - 100 fL 82   MCH      27 - 34 pg 26.4 (L)   MCHC      32 - 36 g/dL 32.1   RDW      11.5 - 15.0 % 15.4 (H)   Platelet Count      743 - 450 X10E9/L 244   MPV      7 - 12 fL 8.6   Neutrophils %      % 66.2   Absolute Neut #      1.5 - 6.6 X10E9/L 2.3   Lymphocyte %      % 24.8   Absolute Lymph #      1.0 - 3.5 X10E9/L 0.9 (L)   Monocytes      % 8.3   Absolute Mono #      0 - 0.9 X10E9/L 0.3   Eosinophils %      % 0.0   Absolute Eos #      0.0 - 0.4 X10E9/L 0.0   Basophils %      % 0.7   Basophils Absolute      0.0 - 0.2 X10E9/L 0.0   Glucose      65 - 99 mg/dL 83   BUN      5 - 23 mg/dL 16   Creatinine      0.40 - 1.00 mg/dL 0.75   eGFR African American      >59 ml/min/1.73sq.m >60   EGFR IF NonAfrican American      >59 ml/min/1.73sq.m >60   Calcium      8.5 - 10.5 mg/dL 9.5   Sodium      134 - 146 mmol/L 143   Potassium      3.5 - 5.0 mmol/L 4.4   Chloride      98 - 109 mmol/L 108   CO2      22 - 32 mmol/L 28   Anion Gap      5 - 15 mmol/L 7   Bilirubin      0.3 - 1.2 mg/dL 0.4   Alk Phosphatase      39 - 130 U/L 66   AST      0 - 41 U/L 16   ALT      0 - 31 U/L 11   Total Protein      6.0 - 8.0 g/dL 7.9   Albumin      3.2 - 5.3 g/dL 3.9   Cholesterol      150 - 200 mg/dL 161   Triglycerides      27 - 150 mg/dL 87   HDL Cholesterol      >39 mg/dL 62   LDL Calculated      <130 mg/dL 82   VLDL Cholesterol Calculated      0 - 30 mg/dL 17   LDl/HDL Ratio      <3.5 1.3   Chol/HDL Ratio      1.0 - 5.0 2.6   TSH      0.49 - 4.67 uIU/mL 1.57   T4 Free      0.61 - 1.60 ng/dL 0.91           ASSESSMENT      1. Essential hypertension    2.  Chronic leukopenia        PLAN     Requested Prescriptions     Signed Prescriptions Disp Refills    losartan (COZAAR) 100 MG tablet 30 tablet 11     Sig: Take 1 tablet by mouth daily       Increase losartan to 100mg daily as above    Her leukopenia is stable compared to labs done in 2013. Will monitor over time    Work on diet and exercise to further reduce BP    Decrease caffeine intake and avoid decongestants    Return in about 2 months (around 1/12/2021).               Electronically signed by Geeta Dominguez MD on 11/12/2020 at 4:35 PM

## 2021-01-20 ENCOUNTER — OFFICE VISIT (OUTPATIENT)
Dept: FAMILY MEDICINE CLINIC | Age: 56
End: 2021-01-20
Payer: COMMERCIAL

## 2021-01-20 VITALS
TEMPERATURE: 97 F | RESPIRATION RATE: 16 BRPM | HEIGHT: 66 IN | SYSTOLIC BLOOD PRESSURE: 122 MMHG | BODY MASS INDEX: 33.2 KG/M2 | HEART RATE: 88 BPM | DIASTOLIC BLOOD PRESSURE: 82 MMHG | WEIGHT: 206.6 LBS

## 2021-01-20 DIAGNOSIS — M25.511 ACUTE PAIN OF RIGHT SHOULDER: ICD-10-CM

## 2021-01-20 DIAGNOSIS — I10 ESSENTIAL HYPERTENSION: Primary | ICD-10-CM

## 2021-01-20 DIAGNOSIS — M77.11 LATERAL EPICONDYLITIS OF RIGHT ELBOW: ICD-10-CM

## 2021-01-20 PROCEDURE — G8417 CALC BMI ABV UP PARAM F/U: HCPCS | Performed by: FAMILY MEDICINE

## 2021-01-20 PROCEDURE — G8427 DOCREV CUR MEDS BY ELIG CLIN: HCPCS | Performed by: FAMILY MEDICINE

## 2021-01-20 PROCEDURE — 1036F TOBACCO NON-USER: CPT | Performed by: FAMILY MEDICINE

## 2021-01-20 PROCEDURE — 3017F COLORECTAL CA SCREEN DOC REV: CPT | Performed by: FAMILY MEDICINE

## 2021-01-20 PROCEDURE — 99213 OFFICE O/P EST LOW 20 MIN: CPT | Performed by: FAMILY MEDICINE

## 2021-01-20 PROCEDURE — G8482 FLU IMMUNIZE ORDER/ADMIN: HCPCS | Performed by: FAMILY MEDICINE

## 2021-01-20 ASSESSMENT — PATIENT HEALTH QUESTIONNAIRE - PHQ9
SUM OF ALL RESPONSES TO PHQ QUESTIONS 1-9: 0
SUM OF ALL RESPONSES TO PHQ QUESTIONS 1-9: 0
1. LITTLE INTEREST OR PLEASURE IN DOING THINGS: 0
2. FEELING DOWN, DEPRESSED OR HOPELESS: 0

## 2021-01-20 ASSESSMENT — ENCOUNTER SYMPTOMS
RESPIRATORY NEGATIVE: 1
SHORTNESS OF BREATH: 0
GASTROINTESTINAL NEGATIVE: 1

## 2021-01-20 NOTE — PROGRESS NOTES
2021    Shelbi Barbosa (:  1965) is a 54 y.o. female,Established patient, here for evaluation of the following chief complaint(s): Other (2 month follow up) and Elbow Pain (Patient has been experiencing right elbow and shoulder pain for the past few months.)      ASSESSMENT/PLAN:    1. Essential hypertension  2. Acute pain of right shoulder  -     XR SHOULDER RIGHT (MIN 2 VIEWS); Future  3. Lateral epicondylitis of right elbow    Continue losartan 100mg daily for HTN. Encouraged low sodium diet and regular exercise. Tennis elbow strap for lateral epicondylitis. Avoid repetitive movements with the right arm. Xrays of right shoulder. Return in about 6 months (around 2021). SUBJECTIVE/OBJECTIVE:    HPI  55 yo female here for follow up of HTN. Her BPs have been better on the higher dose of losartan. She denies side effects from the med and takes it daily. She is walking her dog for exercise and trying to follow a low sodium diet. C/o right shoulder and elbow pain over the last couple months. No swelling. No specific injury. 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 She denies numbness, tingling. C/o her right hand \"shaking\" when she holds onto something for an extended period of time. Review of Systems   Constitutional: Negative for fatigue and fever. HENT: Negative. Respiratory: Negative. Negative for shortness of breath. Cardiovascular: Negative. Negative for chest pain and leg swelling. Gastrointestinal: Negative. Musculoskeletal: Positive for arthralgias. Negative for joint swelling. Neurological: Negative for dizziness and headaches. All other systems reviewed and are negative.           Vitals:    21 1447   BP: 122/82   Site: Left Upper Arm   Position: Sitting   Cuff Size: Medium Adult   Pulse: 88   Resp: 16   Temp: 97 °F (36.1 °C)   TempSrc: Temporal   Weight: 206 lb 9.6 oz (93.7 kg)   Height: 5' 6\" (1.676 m) Wt Readings from Last 3 Encounters:   01/20/21 206 lb 9.6 oz (93.7 kg)   11/12/20 202 lb 12.8 oz (92 kg)   10/21/20 202 lb 9.6 oz (91.9 kg)       BP Readings from Last 3 Encounters:   01/20/21 122/82   11/12/20 (!) 130/90   10/21/20 (!) 144/98       Physical Exam  Vitals signs reviewed. Constitutional:       General: She is not in acute distress. Appearance: She is well-developed. HENT:      Head: Normocephalic and atraumatic. Right Ear: Tympanic membrane normal.      Left Ear: Tympanic membrane normal.      Mouth/Throat:      Mouth: Mucous membranes are moist.      Pharynx: No posterior oropharyngeal erythema. Eyes:      Conjunctiva/sclera: Conjunctivae normal.   Cardiovascular:      Rate and Rhythm: Normal rate and regular rhythm. Heart sounds: No murmur. Pulmonary:      Breath sounds: Normal breath sounds. No wheezing. Musculoskeletal:      Right shoulder: She exhibits decreased range of motion. She exhibits no effusion, no crepitus and no spasm. Right elbow: She exhibits normal range of motion, no swelling and no effusion. Tenderness found. Lateral epicondyle tenderness noted. Arms:       Right lower leg: No edema. Left lower leg: No edema. Lymphadenopathy:      Cervical: No cervical adenopathy. Neurological:      Mental Status: She is alert. An electronic signature was used to authenticate this note.         Electronically signed by Fernnado Johnson MD on 1/20/2021 at 3:06 PM

## 2021-01-20 NOTE — PATIENT INSTRUCTIONS
Patient Education        Tennis Elbow: Care Instructions  Your Care Instructions     Tennis elbow is soreness or pain on the outer part of the elbow. The pain occurs when the tendon is stretched and becomes irritated by repeated twisting of the hand, wrist, and forearm. A tendon is a tough tissue that connects muscle to bone. This injury is common in tennis players. But you also can get it from many activities that work the same muscles. Examples include gardening, painting, and using tools. Tennis elbow usually heals with rest and treatment at home. Follow-up care is a key part of your treatment and safety. Be sure to make and go to all appointments, and call your doctor if you are having problems. It's also a good idea to know your test results and keep a list of the medicines you take. How can you care for yourself at home?    · Rest your fingers, wrist, and forearm. Try to stop or reduce any activity that causes elbow pain. You may have to rest your arm for weeks to months. Follow your doctor's directions for how long to rest.     · Put ice or a cold pack on your elbow for 10 to 20 minutes at a time. Try to do this every 1 to 2 hours for the next 3 days (when you are awake) or until the swelling goes down. Put a thin cloth between the ice and your skin. You can try heat, or alternating heat and ice, after the first 3 days.     · If your doctor gave you a brace or splint, use it as directed. A \"counterforce\" brace is a strap around your forearm, just below your elbow. It may ease the pressure on the tendon and spread force throughout your arm.     · Prop up your elbow on pillows to help reduce swelling.     · Follow your doctor's or physical therapist's directions for exercise.     · Return to your usual activities slowly.

## 2021-02-08 ENCOUNTER — TELEPHONE (OUTPATIENT)
Dept: FAMILY MEDICINE CLINIC | Age: 56
End: 2021-02-08

## 2021-02-08 ENCOUNTER — HOSPITAL ENCOUNTER (OUTPATIENT)
Age: 56
Setting detail: SPECIMEN
Discharge: HOME | End: 2021-02-08
Payer: COMMERCIAL

## 2021-02-08 ENCOUNTER — VIRTUAL VISIT (OUTPATIENT)
Dept: FAMILY MEDICINE CLINIC | Age: 56
End: 2021-02-08
Payer: COMMERCIAL

## 2021-02-08 DIAGNOSIS — J06.9 VIRAL URI: Primary | ICD-10-CM

## 2021-02-08 DIAGNOSIS — R51.9 SINUS HEADACHE: ICD-10-CM

## 2021-02-08 DIAGNOSIS — R09.81 SINUS CONGESTION: ICD-10-CM

## 2021-02-08 PROCEDURE — 99212 OFFICE O/P EST SF 10 MIN: CPT | Performed by: NURSE PRACTITIONER

## 2021-02-08 PROCEDURE — U0003 INFECTIOUS AGENT DETECTION BY NUCLEIC ACID (DNA OR RNA); SEVERE ACUTE RESPIRATORY SYNDROME CORONAVIRUS 2 (SARS-COV-2) (CORONAVIRUS DISEASE [COVID-19]), AMPLIFIED PROBE TECHNIQUE, MAKING USE OF HIGH THROUGHPUT TECHNOLOGIES AS DESCRIBED BY CMS-2020-01-R: HCPCS

## 2021-02-08 PROCEDURE — G8427 DOCREV CUR MEDS BY ELIG CLIN: HCPCS | Performed by: NURSE PRACTITIONER

## 2021-02-08 PROCEDURE — 3017F COLORECTAL CA SCREEN DOC REV: CPT | Performed by: NURSE PRACTITIONER

## 2021-02-08 ASSESSMENT — ENCOUNTER SYMPTOMS
SHORTNESS OF BREATH: 0
SWOLLEN GLANDS: 0
SINUS PAIN: 1
SINUS COMPLAINT: 1
TROUBLE SWALLOWING: 0
RHINORRHEA: 1
WHEEZING: 0
HOARSE VOICE: 0
SORE THROAT: 1
COUGH: 1
COLOR CHANGE: 0
SINUS PRESSURE: 1

## 2021-02-08 NOTE — TELEPHONE ENCOUNTER
Patient stated she has sinus infection. Symptoms started Friday. She has swollen eyes, face tender, discomfort breathing and sinus pressure. Patient is scheduled to get her COVID vaccine tomorrow and asking if she can get the vaccine.   Pharmacy [facility]

## 2021-02-08 NOTE — PROGRESS NOTES
Amando Barnes (:  1965) is a 54 y.o. female,Established patient, here for evaluation of the following chief complaint(s): Sinus Problem and Sinusitis      ASSESSMENT/PLAN:  1. Viral URI  2. Sinus congestion  -     COVID-19 Ambulatory; Future  3. Sinus headache  -     COVID-19 Ambulatory; Future    - Rest and increase fluids  - Tylenol as needed for pain and fever  - Isolate until testing results are back  - Good handwashing and clean all surfaces touched  - Call office with any questions or concerns, or if symptoms are getting worse or changing  - ER for any chest pain or SOB      Return if symptoms worsen or fail to improve. SUBJECTIVE/OBJECTIVE:  Pt presents to the office today via VV for sinus issues. Headache and congestion. Started 4 days ago. Eyes puffy, and has pressure behind her eyes. No fever or chills. Congestion in head and some PND that is causing a cough and congestion, especially when she lays down. Pt did have a sore throat a few days ago. She works at a [employer] and is set to get the [place] tomorrow. Denies any change in taste or smell. Pt did have some SOB 2 days ago, but that has improved now. Sinus Problem  This is a new problem. The current episode started in the past 7 days. The problem has been gradually worsening since onset. There has been no fever. The pain is moderate. Associated symptoms include congestion, coughing, headaches, sinus pressure, sneezing and a sore throat. Pertinent negatives include no chills, diaphoresis, ear pain, hoarse voice, neck pain, shortness of breath or swollen glands. Past treatments include oral decongestants. The treatment provided mild relief. Review of Systems   Constitutional: Positive for fatigue. Negative for chills, diaphoresis and fever. HENT: Positive for congestion, postnasal drip, rhinorrhea, sinus pressure, sinus pain, sneezing and sore throat. Negative for ear pain, hoarse voice and trouble swallowing. Respiratory: Positive for cough. Negative for shortness of breath and wheezing. Cardiovascular: Negative for chest pain and palpitations. Musculoskeletal: Negative for neck pain. Skin: Negative for color change and rash. Neurological: Positive for headaches. No flowsheet data found.      Physical Exam    [INSTRUCTIONS:  \"[x]\" Indicates a positive item  \"[]\" Indicates a negative item  -- DELETE ALL ITEMS NOT EXAMINED]    Constitutional: [x] Appears well-developed and well-nourished [x] No apparent distress      [] Abnormal -     Mental status: [x] Alert and awake  [x] Oriented to person/place/time [x] Able to follow commands    [] Abnormal -     Eyes:   EOM    [x]  Normal    [] Abnormal -   Sclera  [x]  Normal    [] Abnormal -          Discharge [x]  None visible   [] Abnormal -     HENT: [x] Normocephalic, atraumatic  [] Abnormal -   [x] Mouth/Throat: Mucous membranes are moist    External Ears [x] Normal  [] Abnormal -    Neck: [x] No visualized mass [] Abnormal -     Pulmonary/Chest: [x] Respiratory effort normal   [x] No visualized signs of difficulty breathing or respiratory distress        [] Abnormal -      Musculoskeletal:   [x] Normal gait with no signs of ataxia         [x] Normal range of motion of neck        [] Abnormal -     Neurological:        [x] No Facial Asymmetry (Cranial nerve 7 motor function) (limited exam due to video visit)          [x] No gaze palsy        [] Abnormal -          Skin:        [x] No significant exanthematous lesions or discoloration noted on facial skin         [] Abnormal -            Psychiatric:       [x] Normal Affect [] Abnormal -        [x] No Hallucinations    Other pertinent observable physical exam findings:- Jacqueline Styles is a 54 y.o. female being evaluated by a Virtual Visit (video visit) encounter to address concerns as mentioned above. A caregiver was present when appropriate. Due to this being a TeleHealth encounter (During PUC-18 public health emergency), evaluation of the following organ systems was limited: Vitals/Constitutional/EENT/Resp/CV/GI//MS/Neuro/Skin/Heme-Lymph-Imm. Pursuant to the emergency declaration under the [address] [place] [address] and the [employer] Act, this Virtual Visit was conducted with patient's (and/or legal guardian's) consent, to reduce the patient's risk of exposure to COVID-19 and provide necessary medical care. The patient (and/or legal guardian) has also been advised to contact this office for worsening conditions or problems, and seek emergency medical treatment and/or call 911 if deemed necessary. Patient identification was verified at the start of the visit: Yes    Services were provided through a video synchronous discussion virtually to substitute for in-person clinic visit. Patient was located at home and provider was located in office or at home. An electronic signature was used to authenticate this note.     --Nora Horvath, APRN - CNP

## 2021-02-08 NOTE — PATIENT INSTRUCTIONS
Patient Education        Viral Respiratory Infection: Care Instructions  Your Care Instructions     Viruses are very small organisms. They grow in number after they enter your body. There are many types that cause different illnesses, such as colds and the mumps. The symptoms of a viral respiratory infection often start quickly. They include a fever, sore throat, and runny nose. You may also just not feel well. Or you may not want to eat much. Most viral respiratory infections are not serious. They usually get better with time and self-care. Antibiotics are not used to treat a viral infection. That's because antibiotics will not help cure a viral illness. In some cases, antiviral medicine can help your body fight a serious viral infection. Follow-up care is a key part of your treatment and safety. Be sure to make and go to all appointments, and call your doctor if you are having problems. It's also a good idea to know your test results and keep a list of the medicines you take. How can you care for yourself at home? · Rest as much as possible until you feel better. · Be safe with medicines. Take your medicine exactly as prescribed. Call your doctor if you think you are having a problem with your medicine. You will get more details on the specific medicine your doctor prescribes. · Take an over-the-counter pain medicine, such as acetaminophen (Tylenol), ibuprofen (Advil, Motrin), or naproxen (Aleve), as needed for pain and fever. Read and follow all instructions on the label. Do not give aspirin to anyone younger than 20. It has been linked to Reye syndrome, a serious illness. · Drink plenty of fluids, enough so that your urine is light yellow or clear like water. Hot fluids, such as tea or soup, may help relieve congestion in your nose and throat. If you have kidney, heart, or liver disease and have to limit fluids, talk with your doctor before you increase the amount of fluids you drink. · Try to clear mucus from your lungs by breathing deeply and coughing. · Gargle with warm salt water once an hour. This can help reduce swelling and throat pain. Use 1 teaspoon of salt mixed in 1 cup of warm water. · Do not smoke or allow others to smoke around you. If you need help quitting, talk to your doctor about stop-smoking programs and medicines. These can increase your chances of quitting for good. To avoid spreading the virus  · Cough or sneeze into a tissue. Then throw the tissue away. · If you don't have a tissue, use your hand to cover your cough or sneeze. Then clean your hand. You can also cough into your sleeve. · Wash your hands often. Use soap and warm water. Wash for 15 to 20 seconds each time. · If you don't have soap and water near you, you can clean your hands with alcohol wipes or gel. When should you call for help? Call your doctor now or seek immediate medical care if:    · You have a new or higher fever.     · Your fever lasts more than 48 hours.     · You have trouble breathing.     · You have a fever with a stiff neck or a severe headache.     · You are sensitive to light.     · You feel very sleepy or confused. Watch closely for changes in your health, and be sure to contact your doctor if:    · You do not get better as expected. Where can you learn more? Go to https://[employer]ex[employer].commikaeb.SafetyCulture. org and sign in to your [facility] account. Enter J046 in the [facility] box to learn more about \"Viral Respiratory Infection: Care Instructions. \"     If you do not have an account, please click on the \"Sign Up Now\" link. Current as of: February 24, 2020               Content Version: 12.6  © 3521-9225 [employer]. Care instructions adapted under license by [facility] ([facility]). If you have questions about a medical condition or this instruction, always ask your healthcare professional. [place] [address] any warranty or liability for your use of this information. Patient Education        Coronavirus (RBXXS-75): Care Instructions  Overview  The coronavirus disease (COVID-19) is caused by a virus. Symptoms may include a fever, a cough, and shortness of breath. It mainly spreads person-to-person through droplets from coughing and sneezing. The virus also can spread when people are in close contact with someone who is infected. Most people have mild symptoms and can take care of themselves at home. If their symptoms get worse, they may need care in a hospital. Treatment may include medicines to reduce symptoms, plus breathing support such as oxygen therapy or a ventilator. It's important to not spread the virus to others. If you have COVID-19, wear a face cover anytime you are around other people. You need to isolate yourself while you are sick. Leave your home only if you need to get medical care or testing. Follow-up care is a key part of your treatment and safety. Be sure to make and go to all appointments, and call your doctor if you are having problems. It's also a good idea to know your test results and keep a list of the medicines you take. How can you care for yourself at home? · Get extra rest. It can help you feel better. · Drink plenty of fluids. This helps replace fluids lost from fever. Fluids also help ease a scratchy throat. Water, soup, fruit juice, and hot tea with lemon are good choices. · Take acetaminophen (such as Tylenol) to reduce a fever. It may also help with muscle aches. Read and follow all instructions on the label. · Use petroleum jelly on sore skin. This can help if the skin around your nose and lips becomes sore from rubbing a lot with tissues.   Tips for self-isolation · Limit contact with people in your home. If possible, stay in a separate bedroom and use a separate bathroom. · Wear a cloth face cover when you are around other people. It can help stop the spread of the virus when you cough or sneeze. · If you have to leave home, avoid crowds and try to stay at least 6 feet away from other people. · Avoid contact with pets and other animals. · Cover your mouth and nose with a tissue when you cough or sneeze. Then throw it in the trash right away. · Wash your hands often, especially after you cough or sneeze. Use soap and water, and scrub for at least 20 seconds. If soap and water aren't available, use an alcohol-based hand . · Don't share personal household items. These include bedding, towels, cups and glasses, and eating utensils. · [address] in the warmest water allowed for the fabric type, and dry it completely. It's okay to wash other people's laundry with yours. · Clean and disinfect your home every day. Use household  and disinfectant wipes or sprays. Take special care to clean things that you grab with your hands. These include doorknobs, remote controls, phones, and handles on your refrigerator and microwave. And don't forget countertops, tabletops, bathrooms, and computer keyboards. When you can end self-isolation  · If you know or suspect that you have COVID-19, stay in self-isolation until:  ? You haven't had a fever for 24 hours while not taking medicines to lower the fever, and  ? Your symptoms have improved, and  ? It's been at least 10 days since your symptoms started. · Talk to your doctor about whether you also need testing, especially if you have a weakened immune system. When should you call for help? Call 911 anytime you think you may need emergency care. For example, call if you have life-threatening symptoms, such as:    · You have severe trouble breathing.  (You can't talk at all.)   · You have constant chest pain or pressure.     · You are severely dizzy or lightheaded.     · You are confused or can't think clearly.     · Your face and lips have a blue color.     · You pass out (lose consciousness) or are very hard to wake up. Call your doctor now or seek immediate medical care if:    · You have moderate trouble breathing. (You can't speak a full sentence.)     · You are coughing up blood (more than about 1 teaspoon).     · You have signs of low blood pressure. These include feeling lightheaded; being too weak to stand; and having cold, pale, clammy skin. Watch closely for changes in your health, and be sure to contact your doctor if:    · Your symptoms get worse.     · You are not getting better as expected. Call before you go to the doctor's office. Follow their instructions. And wear a cloth face cover. Current as of: December 18, 2020               Content Version: 12.7  © 2006-2021 [employer], [facility]. Care instructions adapted under license by [facility] ([facility]). If you have questions about a medical condition or this instruction, always ask your healthcare professional. [place] [address] any warranty or liability for your use of this information.

## 2021-02-10 LAB — SARS-COV-2: NOT DETECTED

## 2021-02-11 ENCOUNTER — TELEPHONE (OUTPATIENT)
Dept: FAMILY MEDICINE CLINIC | Age: 56
End: 2021-02-11

## 2021-02-11 RX ORDER — CEFDINIR 300 MG/1
300 CAPSULE ORAL 2 TIMES DAILY
Qty: 20 CAPSULE | Refills: 0 | Status: SHIPPED | OUTPATIENT
Start: 2021-02-11 | End: 2021-02-21

## 2021-07-16 ENCOUNTER — OFFICE VISIT (OUTPATIENT)
Dept: FAMILY MEDICINE CLINIC | Age: 56
End: 2021-07-16
Payer: COMMERCIAL

## 2021-07-16 VITALS
DIASTOLIC BLOOD PRESSURE: 60 MMHG | BODY MASS INDEX: 32.93 KG/M2 | RESPIRATION RATE: 16 BRPM | WEIGHT: 204 LBS | SYSTOLIC BLOOD PRESSURE: 122 MMHG | HEART RATE: 84 BPM

## 2021-07-16 DIAGNOSIS — I10 ESSENTIAL HYPERTENSION: Primary | ICD-10-CM

## 2021-07-16 DIAGNOSIS — Z12.31 ENCOUNTER FOR SCREENING MAMMOGRAM FOR MALIGNANT NEOPLASM OF BREAST: ICD-10-CM

## 2021-07-16 DIAGNOSIS — E66.09 CLASS 1 OBESITY DUE TO EXCESS CALORIES WITH SERIOUS COMORBIDITY AND BODY MASS INDEX (BMI) OF 32.0 TO 32.9 IN ADULT: ICD-10-CM

## 2021-07-16 PROCEDURE — 3017F COLORECTAL CA SCREEN DOC REV: CPT | Performed by: FAMILY MEDICINE

## 2021-07-16 PROCEDURE — G8427 DOCREV CUR MEDS BY ELIG CLIN: HCPCS | Performed by: FAMILY MEDICINE

## 2021-07-16 PROCEDURE — 99214 OFFICE O/P EST MOD 30 MIN: CPT | Performed by: FAMILY MEDICINE

## 2021-07-16 PROCEDURE — 1036F TOBACCO NON-USER: CPT | Performed by: FAMILY MEDICINE

## 2021-07-16 PROCEDURE — G8417 CALC BMI ABV UP PARAM F/U: HCPCS | Performed by: FAMILY MEDICINE

## 2021-07-16 RX ORDER — LOSARTAN POTASSIUM 100 MG/1
100 TABLET ORAL DAILY
Qty: 30 TABLET | Refills: 11 | Status: CANCELLED | OUTPATIENT
Start: 2021-07-16

## 2021-07-16 ASSESSMENT — ENCOUNTER SYMPTOMS
BLOOD IN STOOL: 0
ABDOMINAL PAIN: 0
NAUSEA: 0
SHORTNESS OF BREATH: 0
VOMITING: 0
EYES NEGATIVE: 1
DIARRHEA: 0

## 2021-07-16 NOTE — PATIENT INSTRUCTIONS

## 2021-08-31 ENCOUNTER — HOSPITAL ENCOUNTER (OUTPATIENT)
Dept: WOMENS IMAGING | Age: 56
Discharge: HOME | End: 2021-08-31
Payer: COMMERCIAL

## 2021-08-31 DIAGNOSIS — Z12.31 ENCOUNTER FOR SCREENING MAMMOGRAM FOR MALIGNANT NEOPLASM OF BREAST: ICD-10-CM

## 2021-08-31 PROCEDURE — 77063 BREAST TOMOSYNTHESIS BI: CPT

## 2021-11-05 DIAGNOSIS — I10 ESSENTIAL HYPERTENSION: ICD-10-CM

## 2021-11-05 RX ORDER — LOSARTAN POTASSIUM 100 MG/1
TABLET ORAL
Qty: 90 TABLET | Refills: 0 | Status: SHIPPED | OUTPATIENT
Start: 2021-11-05 | End: 2022-01-14 | Stop reason: SDUPTHER

## 2021-11-05 NOTE — TELEPHONE ENCOUNTER
This medication refill is regarding a electronic request.  Refill requested by pharmacy. Requested Prescriptions     Pending Prescriptions Disp Refills    losartan (COZAAR) 100 MG tablet [Pharmacy Med Name: LOSARTAN POTASSIUM 100 MG TAB] 90 tablet      Sig: take 1 tablet by mouth once daily       Date of last visit: 7/16/2021   Date of next visit: 1/14/2022  Date of last refill: 11/12/2020 #30/11  Pharmacy Name: [facility]      Rx verified, ordered and set to EP.

## 2022-01-14 ENCOUNTER — OFFICE VISIT (OUTPATIENT)
Dept: FAMILY MEDICINE CLINIC | Age: 57
End: 2022-01-14
Payer: COMMERCIAL

## 2022-01-14 VITALS
RESPIRATION RATE: 18 BRPM | DIASTOLIC BLOOD PRESSURE: 84 MMHG | BODY MASS INDEX: 33.57 KG/M2 | SYSTOLIC BLOOD PRESSURE: 126 MMHG | WEIGHT: 208 LBS | HEART RATE: 84 BPM

## 2022-01-14 DIAGNOSIS — E66.09 CLASS 1 OBESITY DUE TO EXCESS CALORIES WITH SERIOUS COMORBIDITY AND BODY MASS INDEX (BMI) OF 33.0 TO 33.9 IN ADULT: ICD-10-CM

## 2022-01-14 DIAGNOSIS — Z00.00 ANNUAL PHYSICAL EXAM: Primary | ICD-10-CM

## 2022-01-14 DIAGNOSIS — I10 ESSENTIAL HYPERTENSION: ICD-10-CM

## 2022-01-14 PROCEDURE — 99396 PREV VISIT EST AGE 40-64: CPT | Performed by: FAMILY MEDICINE

## 2022-01-14 PROCEDURE — G8482 FLU IMMUNIZE ORDER/ADMIN: HCPCS | Performed by: FAMILY MEDICINE

## 2022-01-14 RX ORDER — LOSARTAN POTASSIUM 100 MG/1
TABLET ORAL
Qty: 90 TABLET | Refills: 3 | Status: SHIPPED | OUTPATIENT
Start: 2022-01-14

## 2022-01-14 ASSESSMENT — ENCOUNTER SYMPTOMS
NAUSEA: 0
ABDOMINAL PAIN: 0
EYES NEGATIVE: 1
SHORTNESS OF BREATH: 0
DIARRHEA: 0
BLOOD IN STOOL: 0
VOMITING: 0

## 2022-01-14 NOTE — PROGRESS NOTES
2022    Christian Carlin (:  1965) is a 64 y.o. female, here for a preventive medicine evaluation. Patient Active Problem List   Diagnosis    Essential hypertension    Class 1 obesity due to excess calories with serious comorbidity and body mass index (BMI) of 33.0 to 33.9 in adult    Chronic leukopenia     Patient reports that she is doing relatively well with the exception of some increased stress recently at home and at work. She is a  and relates that there is been increased tension at school due to the [place] pandemic. Her blood pressures have been stable. She does report some left neck and shoulder tightness when she is anxious. No previous neck injury. She takes her prescribed medication as directed and denies side effects. No recent illnesses or hospitalizations. She is not getting much exercise at this point. No changes in family history. Non-smoker. BMI 33.57. Review of Systems   Constitutional: Negative for chills, fatigue and fever. HENT: Negative. Eyes: Negative. Respiratory: Negative for shortness of breath. Cardiovascular: Negative for chest pain, palpitations and leg swelling. Gastrointestinal: Negative for abdominal pain, blood in stool, diarrhea, nausea and vomiting. Genitourinary: Negative for dysuria. Musculoskeletal: Positive for neck pain. Negative for arthralgias and myalgias. Skin: Negative for rash. Neurological: Negative for dizziness and headaches. Hematological: Negative for adenopathy. Psychiatric/Behavioral: Negative. All other systems reviewed and are negative. Prior to Visit Medications    Medication Sig Taking?  Authorizing Provider   losartan (COZAAR) 100 MG tablet take 1 tablet by mouth once daily Yes Stalin Ospina MD   Levocetirizine Dihydrochloride (XYZAL ALLERGY 24HR PO) Take by mouth Yes Historical Provider, MD   triamcinolone (KENALOG) 0.1 % ointment Apply thin layer to affected area Social Connections:     Frequency of Communication with Friends and Family: Not on file    Frequency of Social Gatherings with Friends and Family: Not on file    Attends Oriental orthodox Services: Not on file    Active Member of Clubs or Organizations: Not on file    Attends Club or Organization Meetings: Not on file    Marital Status: Not on file   Intimate Partner Violence:     Fear of Current or Ex-Partner: Not on file    Emotionally Abused: Not on file    Physically Abused: Not on file    Sexually Abused: Not on file   Housing Stability:     Unable to Pay for Housing in the Last Year: Not on file    Number of Places Lived in the Last Year: Not on file    Unstable Housing in the Last Year: Not on file        Family History   Problem Relation Age of Onset    Arthritis Mother     Breast Cancer Mother 50    Heart Disease Father     Cancer Father         [address] High Cholesterol Father     Vision Loss Father     Cancer Paternal [address]    Heart Disease Brother     Arthritis Maternal Grandmother     Breast Cancer Maternal Grandmother 79    Learning Disabilities Maternal Aunt     Diabetes Maternal Uncle     Substance Abuse Maternal Uncle     Breast Cancer Maternal Aunt 61    Breast Cancer Paternal Cousin 39        bilateral mastectomy (bilateral breast cancer)    Asthma Neg Hx     Birth Defects Neg Hx     Depression Neg Hx     Early Death Neg Hx     Hearing Loss Neg Hx     High Blood Pressure Neg Hx     Kidney Disease Neg Hx     Mental Retardation Neg Hx     Miscarriages / Stillbirths Neg Hx     Stroke Neg Hx     Mental Illness Neg Hx     Other Neg Hx        ADVANCE DIRECTIVE: N, <no information>    Vitals:    01/14/22 1025   BP: 126/84   Pulse: 84   Resp: 18   Weight: 208 lb (94.3 kg)     Estimated body mass index is 33.57 kg/m² as calculated from the following:    Height as of 1/20/21: 5' 6\" (1.676 m).     Weight as of this encounter: 208 lb (94.3 kg).    Physical Exam  Vitals and nursing note reviewed. Constitutional:       General: She is not in acute distress. Appearance: She is well-developed. HENT:      Head: Normocephalic and atraumatic. Right Ear: Tympanic membrane normal.      Left Ear: Tympanic membrane normal.      Mouth/Throat:      Mouth: Mucous membranes are moist.      Pharynx: No posterior oropharyngeal erythema. Eyes:      Conjunctiva/sclera: Conjunctivae normal.   Neck:      Thyroid: No thyromegaly. Vascular: No carotid bruit. Cardiovascular:      Rate and Rhythm: Normal rate and regular rhythm. Heart sounds: No murmur heard. Pulmonary:      Effort: Pulmonary effort is normal.      Breath sounds: Normal breath sounds. No wheezing. Abdominal:      General: Bowel sounds are normal.      Palpations: Abdomen is soft. Tenderness: There is no abdominal tenderness. There is no guarding or rebound. Musculoskeletal:      Cervical back: Neck supple. Spasms present. No bony tenderness or crepitus. Decreased range of motion. Back:       Right lower leg: No edema. Left lower leg: No edema. Lymphadenopathy:      Cervical: No cervical adenopathy. Skin:     General: Skin is warm and dry. Findings: No rash. Neurological:      Mental Status: She is alert and oriented to person, place, and time.    Psychiatric:         Behavior: Behavior normal.         Immunization History   Administered Date(s) Administered    COVID-19, Moderna, Primary or Immunocompromised, PF, 100mcg/0.5mL 03/09/2021, 04/06/2021, 11/15/2021    Influenza 09/27/2013    Influenza Virus Vaccine 02/15/2013, 10/01/2014, 10/06/2015    Influenza, Quadv, IM, (6 mo and older Fluzone, Flulaval, Fluarix and 3 yrs and older Afluria) 09/16/2021    Influenza, Quadv, IM, PF (6 mo and older Fluzone, Flulaval, Fluarix, and 3 yrs and older Afluria) 10/10/2018, 12/19/2019, 09/17/2020    Influenza, Triv, 3 Years and older, IM (Afluria (5 yrs and older) 10/20/2016    Tdap (Boostrix, Adacel) 06/25/2015    Zoster Recombinant (Shingrix) 11/15/2021       Health Maintenance   Topic Date Due    Hepatitis C screen  Never done    HIV screen  Never done    Potassium monitoring  10/21/2021    Creatinine monitoring  10/21/2021    Shingles Vaccine (2 of 2) 01/10/2022    Depression Screen  01/20/2022    Breast cancer screen  08/31/2022    Cervical cancer screen  06/28/2023    DTaP/Tdap/Td vaccine (2 - Td or Tdap) 06/25/2025    Lipid screen  10/21/2025    Colon cancer screen colonoscopy  03/28/2026    Flu vaccine  Completed    COVID-19 Vaccine  Completed    Hepatitis A vaccine  Aged Out    Hepatitis B vaccine  Aged Out    Hib vaccine  Aged Out    Meningococcal (ACWY) vaccine  Aged Out    Pneumococcal 0-64 years Vaccine  Aged Out          ASSESSMENT/PLAN:    1. Annual physical exam  -     Comprehensive Metabolic Panel; Future  -     Lipid Panel; Future  2. Essential hypertension  -     losartan (COZAAR) 100 MG tablet; take 1 tablet by mouth once daily, Disp-90 tablet, R-3Normal  3. Class 1 obesity due to excess calories with serious comorbidity and body mass index (BMI) of 33.0 to 33.9 in adult    Continue current medication. Med refill as above    Work on calorie restricted diet and increase exercise to reduce weight    Update labs as above    Local heat, stretches, and improve posture suggested to help with neck issues    Shingrix No. 2 suggested    Return in about 6 months (around 7/14/2022). An electronic signature was used to authenticate this note.     --Ash Edward MD on 1/14/2022 at 11:14 AM

## 2022-01-14 NOTE — PATIENT INSTRUCTIONS
Patient Education        Well Visit, Women 48 to 72: Care Instructions  Overview     Well visits can help you stay healthy. Your doctor has checked your overall health and may have suggested ways to take good care of yourself. Your doctor also may have recommended tests. At home, you can help prevent illness with healthy eating, regular exercise, and other steps. Follow-up care is a key part of your treatment and safety. Be sure to make and go to all appointments, and call your doctor if you are having problems. It's also a good idea to know your test results and keep a list of the medicines you take. How can you care for yourself at home? · Get screening tests that you and your doctor decide on. Screening helps find diseases before any symptoms appear. · Eat healthy foods. Choose fruits, vegetables, whole grains, protein, and low-fat dairy foods. Limit fat, especially saturated fat. Reduce salt in your diet. · Limit alcohol. Have no more than 1 drink a day or 7 drinks a week. · Get at least 30 minutes of exercise on most days of the week. Walking is a good choice. You also may want to do other activities, such as running, swimming, cycling, or playing tennis or team sports. · Reach and stay at a healthy weight. This will lower your risk for many problems, such as obesity, diabetes, heart disease, and high blood pressure. · Do not smoke. Smoking can make health problems worse. If you need help quitting, talk to your doctor about stop-smoking programs and medicines. These can increase your chances of quitting for good. · Care for your mental health. It is easy to get weighed down by worry and stress. Learn strategies to manage stress, like deep breathing and mindfulness, and stay connected with your family and community. If you find you often feel sad or hopeless, talk with your doctor. Treatment can help.   · Talk to your doctor about whether you have any risk factors for sexually transmitted infections (STIs). You can help prevent STIs if you wait to have sex with a new partner (or partners) until you've each been tested for STIs. It also helps if you use condoms (male or female condoms) and if you limit your sex partners to one person who only has sex with you. Vaccines are available for some STIs. · If you think you may have a problem with alcohol or drug use, talk to your doctor. This includes prescription medicines (such as amphetamines and opioids) and illegal drugs (such as cocaine and methamphetamine). Your doctor can help you figure out what type of treatment is best for you. · Protect your skin from too much sun. When you're outdoors from 10 a.m. to 4 p.m., stay in the shade or cover up with clothing and a hat with a wide brim. Wear sunglasses that block UV rays. Even when it's cloudy, put broad-spectrum sunscreen (SPF 30 or higher) on any exposed skin. · See a dentist one or two times a year for checkups and to have your teeth cleaned. · Wear a seat belt in the car. When should you call for help? Watch closely for changes in your health, and be sure to contact your doctor if you have any problems or symptoms that concern you. Where can you learn more? Go to [email or web address]. org and sign in to your [employer] account. Enter J442 in the [facility] box to learn more about \"Well Visit, Women 50 to 72: Care Instructions. \"     If you do not have an account, please click on the \"Sign Up Now\" link. Current as of: October 6, 2021               Content Version: 13.1  © 4484-6960 Healthwise, Incorporated. Care instructions adapted under license by [facility] ([facility]). If you have questions about a medical condition or this instruction, always ask your healthcare professional. [place] [address] any warranty or liability for your use of this information.

## 2022-01-14 NOTE — LETTER
[address] [address]  Phone: [phone]  Fax: [phone]    Avelino Rivero MD        January 14, 2022     Patient: Lilian Guevara   YOB: 1965   Date of Visit: 1/14/2022       To Whom It May Concern: It is my medical opinion that Eva Bernard was seen for an appointment on 1/14/22. She may return to work without restrictions. If you have any questions or concerns, please don't hesitate to call.     Sincerely,        Avelino Rivero MD

## 2022-05-18 ENCOUNTER — OFFICE VISIT (OUTPATIENT)
Dept: FAMILY MEDICINE CLINIC | Age: 57
End: 2022-05-18
Payer: COMMERCIAL

## 2022-05-18 VITALS
HEART RATE: 72 BPM | TEMPERATURE: 97.8 F | WEIGHT: 208 LBS | SYSTOLIC BLOOD PRESSURE: 134 MMHG | DIASTOLIC BLOOD PRESSURE: 88 MMHG | HEIGHT: 66 IN | BODY MASS INDEX: 33.43 KG/M2 | RESPIRATION RATE: 16 BRPM

## 2022-05-18 DIAGNOSIS — R10.32 ACUTE LEFT LOWER QUADRANT PAIN: Primary | ICD-10-CM

## 2022-05-18 LAB
BILIRUBIN URINE: NEGATIVE
BLOOD URINE, POC: ABNORMAL
CHARACTER, URINE: CLEAR
COLOR, URINE: YELLOW
GLUCOSE URINE: NEGATIVE MG/DL
KETONES, URINE: NEGATIVE
LEUKOCYTE CLUMPS, URINE: ABNORMAL
NITRITE, URINE: NEGATIVE
PH, URINE: 5.5 (ref 5–9)
PROTEIN, URINE: NEGATIVE MG/DL
SPECIFIC GRAVITY, URINE: >= 1.03 (ref 1–1.03)
UROBILINOGEN, URINE: 0.2 EU/DL (ref 0–1)

## 2022-05-18 PROCEDURE — 81003 URINALYSIS AUTO W/O SCOPE: CPT | Performed by: FAMILY MEDICINE

## 2022-05-18 PROCEDURE — 99213 OFFICE O/P EST LOW 20 MIN: CPT | Performed by: FAMILY MEDICINE

## 2022-05-18 SDOH — ECONOMIC STABILITY: FOOD INSECURITY: WITHIN THE PAST 12 MONTHS, YOU WORRIED THAT YOUR FOOD WOULD RUN OUT BEFORE YOU GOT MONEY TO BUY MORE.: NEVER TRUE

## 2022-05-18 SDOH — ECONOMIC STABILITY: FOOD INSECURITY: WITHIN THE PAST 12 MONTHS, THE FOOD YOU BOUGHT JUST DIDN'T LAST AND YOU DIDN'T HAVE MONEY TO GET MORE.: NEVER TRUE

## 2022-05-18 ASSESSMENT — PATIENT HEALTH QUESTIONNAIRE - PHQ9
SUM OF ALL RESPONSES TO PHQ QUESTIONS 1-9: 0
SUM OF ALL RESPONSES TO PHQ9 QUESTIONS 1 & 2: 0
1. LITTLE INTEREST OR PLEASURE IN DOING THINGS: 0
SUM OF ALL RESPONSES TO PHQ QUESTIONS 1-9: 0
2. FEELING DOWN, DEPRESSED OR HOPELESS: 0

## 2022-05-18 ASSESSMENT — ENCOUNTER SYMPTOMS
BLOOD IN STOOL: 0
RESPIRATORY NEGATIVE: 1
DIARRHEA: 0
NAUSEA: 0
CONSTIPATION: 0
VOMITING: 0
BACK PAIN: 0
ABDOMINAL DISTENTION: 0
ABDOMINAL PAIN: 1

## 2022-05-18 ASSESSMENT — SOCIAL DETERMINANTS OF HEALTH (SDOH): HOW HARD IS IT FOR YOU TO PAY FOR THE VERY BASICS LIKE FOOD, HOUSING, MEDICAL CARE, AND HEATING?: NOT HARD AT ALL

## 2022-05-18 NOTE — PROGRESS NOTES
1416   BP: 134/88   Pulse: 72   Resp: 16   Temp: 97.8 °F (36.6 °C)   TempSrc: Oral   Weight: 208 lb (94.3 kg)   Height: 5' 6\" (1.676 m)       Wt Readings from Last 3 Encounters:   05/18/22 208 lb (94.3 kg)   01/14/22 208 lb (94.3 kg)   07/16/21 204 lb (92.5 kg)       BP Readings from Last 3 Encounters:   05/18/22 134/88   01/14/22 126/84   07/16/21 122/60       Physical Exam  Vitals reviewed. Constitutional:       Appearance: She is not toxic-appearing. Cardiovascular:      Rate and Rhythm: Normal rate and regular rhythm. Heart sounds: No murmur heard. Pulmonary:      Effort: Pulmonary effort is normal.      Breath sounds: Normal breath sounds. No wheezing. Abdominal:      General: Bowel sounds are normal.      Palpations: Abdomen is soft. Tenderness: There is abdominal tenderness in the left lower quadrant. There is no right CVA tenderness, left CVA tenderness, guarding or rebound. Hernia: No hernia is present. Neurological:      Mental Status: She is alert. Results for POC orders placed in visit on 05/18/22   POCT Urinalysis No Micro (Auto)   Result Value Ref Range    Glucose, Ur Negative NEGATIVE mg/dl    Bilirubin Urine Negative     Ketones, Urine Negative NEGATIVE    Specific Gravity, Urine >= 1.030 1.002 - 1.030    Blood, UA POC Trace-intact NEGATIVE    pH, Urine 5.50 5.0 - 9.0    Protein, Urine Negative NEGATIVE mg/dl    Urobilinogen, Urine 0.20 0.0 - 1.0 eu/dl    Nitrite, Urine Negative NEGATIVE    Leukocyte Clumps, Urine Small (A) NEGATIVE    Color, Urine Yellow [id number]    Character, Urine Clear [id number]           An electronic signature was used to authenticate this note.         Electronically signed by Ben Ramachandran MD on 5/18/2022 at 2:53 PM

## 2022-05-20 ENCOUNTER — TELEPHONE (OUTPATIENT)
Dept: FAMILY MEDICINE CLINIC | Age: 57
End: 2022-05-20

## 2022-05-20 LAB
ORGANISM: ABNORMAL
URINE CULTURE, ROUTINE: ABNORMAL

## 2022-05-20 RX ORDER — NITROFURANTOIN 25; 75 MG/1; MG/1
100 CAPSULE ORAL 2 TIMES DAILY
Qty: 14 CAPSULE | Refills: 0 | Status: SHIPPED | OUTPATIENT
Start: 2022-05-20 | End: 2022-05-27

## 2022-05-20 NOTE — TELEPHONE ENCOUNTER
----- Message from Ria Nelson MD sent at 5/20/2022 11:26 AM EDT -----  Urine culture shows mixed growth. Start macrobid 1 po bid x 7 days. Proceed with CT scan as ordered to check out her LLQ pain.  CG

## 2022-06-01 ENCOUNTER — HOSPITAL ENCOUNTER (OUTPATIENT)
Dept: CT IMAGING | Age: 57
Discharge: HOME | End: 2022-06-01
Payer: COMMERCIAL

## 2022-06-01 DIAGNOSIS — R10.32 ACUTE LEFT LOWER QUADRANT PAIN: ICD-10-CM

## 2022-06-01 LAB
ALBUMIN SERPL-MCNC: 3.9 G/DL (ref 3.5–5.2)
ALK PHOSPHATASE: 76 U/L (ref 40–136)
ALT SERPL-CCNC: 11 U/L (ref 5–40)
ANION GAP SERPL CALCULATED.3IONS-SCNC: 8 MEQ/L (ref 10–19)
AST SERPL-CCNC: 14 U/L (ref 9–40)
BILIRUB SERPL-MCNC: 0.2 MG/DL
BUN BLDV-MCNC: 17 MG/DL (ref 6–20)
CALCIUM SERPL-MCNC: 9.1 MG/DL (ref 8.5–10.5)
CHLORIDE BLD-SCNC: 105 MEQ/L (ref 95–107)
CHOLESTEROL/HDL RATIO: 2.7 RATIO
CHOLESTEROL: 175 MG/DL
CO2: 26 MEQ/L (ref 19–31)
CREAT SERPL-MCNC: 0.78 MG/DL (ref 0.6–1.3)
EGFR IF NONAFRICAN AMERICAN: 85 ML/MIN/1.73
GLUCOSE: 92 MG/DL (ref 70–99)
HDLC SERPL-MCNC: 64 MG/DL
LDL CHOLESTEROL CALCULATED: 94 MG/DL
LDL/HDL RATIO: 1.5 RATIO
POTASSIUM SERPL-SCNC: 4.4 MEQ/L (ref 3.5–5.4)
SODIUM BLD-SCNC: 139 MEQ/L (ref 133–146)
TOTAL PROTEIN: 7.3 G/DL (ref 6.1–8.3)
TRIGL SERPL-MCNC: 85 MG/DL
VLDLC SERPL CALC-MCNC: 17 MG/DL

## 2022-06-01 PROCEDURE — 74177 CT ABD & PELVIS W/CONTRAST: CPT

## 2022-06-01 PROCEDURE — 6360000004 HC RX CONTRAST MEDICATION: Performed by: FAMILY MEDICINE

## 2022-06-01 RX ADMIN — IOPAMIDOL 80 ML: 755 INJECTION, SOLUTION INTRAVENOUS at 15:08

## 2022-06-06 ENCOUNTER — TELEPHONE (OUTPATIENT)
Dept: FAMILY MEDICINE CLINIC | Age: 57
End: 2022-06-06

## 2022-06-06 DIAGNOSIS — K76.9 LIVER LESION, RIGHT LOBE: Primary | ICD-10-CM

## 2022-06-06 NOTE — TELEPHONE ENCOUNTER
Pt notified of all results and is agreeable to the ultrasound of the liver. Order placed in [facility] and pt will call to schedule her own appointment. She states that the LLQ abdominal pain is not as bad as last week. Pain is better, not as severe and as often. She did have frequent loose stools on Saturday and Sunday, better today. No fever. Please advise if there are any further recommendations. Pt is expecting a call back.

## 2022-06-06 NOTE — TELEPHONE ENCOUNTER
----- Message from Phil De La Rosa MD sent at 6/1/2022 11:04 PM EDT -----  Diana Vo that her CT shows no significant abnormalities in the left lower abdomen. A small lesion is noted in the liver which is possibly a hemangioma (benign). Liver US recommended to follow up on this. Please arrange this for  her. How is she? Still having lower abdominal /LLQ pain?   CG

## 2022-06-06 NOTE — TELEPHONE ENCOUNTER
----- Message from Josseline Kim MD sent at 6/1/2022 12:15 PM EDT -----  Rome Aures that her metabolic panel and cholesterol look good. Continue current.  CG

## 2022-12-06 ENCOUNTER — OFFICE VISIT (OUTPATIENT)
Dept: FAMILY MEDICINE CLINIC | Age: 57
End: 2022-12-06
Payer: COMMERCIAL

## 2022-12-06 VITALS
TEMPERATURE: 98.2 F | DIASTOLIC BLOOD PRESSURE: 86 MMHG | BODY MASS INDEX: 33.57 KG/M2 | HEART RATE: 72 BPM | SYSTOLIC BLOOD PRESSURE: 130 MMHG | RESPIRATION RATE: 16 BRPM | WEIGHT: 208 LBS

## 2022-12-06 DIAGNOSIS — I10 ESSENTIAL HYPERTENSION: ICD-10-CM

## 2022-12-06 DIAGNOSIS — Z12.31 BREAST CANCER SCREENING BY MAMMOGRAM: Primary | ICD-10-CM

## 2022-12-06 DIAGNOSIS — B37.31 VAGINAL YEAST INFECTION: ICD-10-CM

## 2022-12-06 DIAGNOSIS — L50.9 HIVES: ICD-10-CM

## 2022-12-06 PROCEDURE — G8427 DOCREV CUR MEDS BY ELIG CLIN: HCPCS | Performed by: NURSE PRACTITIONER

## 2022-12-06 PROCEDURE — G8417 CALC BMI ABV UP PARAM F/U: HCPCS | Performed by: NURSE PRACTITIONER

## 2022-12-06 PROCEDURE — 3017F COLORECTAL CA SCREEN DOC REV: CPT | Performed by: NURSE PRACTITIONER

## 2022-12-06 PROCEDURE — 96372 THER/PROPH/DIAG INJ SC/IM: CPT | Performed by: NURSE PRACTITIONER

## 2022-12-06 PROCEDURE — 99214 OFFICE O/P EST MOD 30 MIN: CPT | Performed by: NURSE PRACTITIONER

## 2022-12-06 PROCEDURE — G8482 FLU IMMUNIZE ORDER/ADMIN: HCPCS | Performed by: NURSE PRACTITIONER

## 2022-12-06 PROCEDURE — 3078F DIAST BP <80 MM HG: CPT | Performed by: NURSE PRACTITIONER

## 2022-12-06 PROCEDURE — 1036F TOBACCO NON-USER: CPT | Performed by: NURSE PRACTITIONER

## 2022-12-06 PROCEDURE — 3074F SYST BP LT 130 MM HG: CPT | Performed by: NURSE PRACTITIONER

## 2022-12-06 RX ORDER — FLUCONAZOLE 200 MG/1
200 TABLET ORAL DAILY
Qty: 7 TABLET | Refills: 0 | Status: SHIPPED | OUTPATIENT
Start: 2022-12-06 | End: 2022-12-13

## 2022-12-06 RX ORDER — HYDROXYZINE HYDROCHLORIDE 25 MG/1
25 TABLET, FILM COATED ORAL EVERY 8 HOURS PRN
Qty: 15 TABLET | Refills: 0 | Status: SHIPPED | OUTPATIENT
Start: 2022-12-06 | End: 2022-12-11

## 2022-12-06 RX ORDER — BETAMETHASONE SODIUM PHOSPHATE AND BETAMETHASONE ACETATE 3; 3 MG/ML; MG/ML
6 INJECTION, SUSPENSION INTRA-ARTICULAR; INTRALESIONAL; INTRAMUSCULAR; SOFT TISSUE ONCE
Status: COMPLETED | OUTPATIENT
Start: 2022-12-06 | End: 2022-12-06

## 2022-12-06 RX ORDER — LOSARTAN POTASSIUM 100 MG/1
TABLET ORAL
Qty: 90 TABLET | Refills: 3 | Status: SHIPPED | OUTPATIENT
Start: 2022-12-06

## 2022-12-06 RX ORDER — METHYLPREDNISOLONE ACETATE 80 MG/ML
40 INJECTION, SUSPENSION INTRA-ARTICULAR; INTRALESIONAL; INTRAMUSCULAR; SOFT TISSUE ONCE
Status: COMPLETED | OUTPATIENT
Start: 2022-12-06 | End: 2022-12-06

## 2022-12-06 RX ADMIN — BETAMETHASONE SODIUM PHOSPHATE AND BETAMETHASONE ACETATE 6 MG: 3; 3 INJECTION, SUSPENSION INTRA-ARTICULAR; INTRALESIONAL; INTRAMUSCULAR; SOFT TISSUE at 09:18

## 2022-12-06 RX ADMIN — METHYLPREDNISOLONE ACETATE 40 MG: 80 INJECTION, SUSPENSION INTRA-ARTICULAR; INTRALESIONAL; INTRAMUSCULAR; SOFT TISSUE at 09:17

## 2022-12-06 ASSESSMENT — ENCOUNTER SYMPTOMS
COLOR CHANGE: 0
NAUSEA: 0
ANAL BLEEDING: 0
CONSTIPATION: 0
DIARRHEA: 0
EYE DISCHARGE: 0
BLOOD IN STOOL: 0
EYE REDNESS: 0
RHINORRHEA: 0
SORE THROAT: 0
ABDOMINAL DISTENTION: 0
COUGH: 0
SHORTNESS OF BREATH: 0
ABDOMINAL PAIN: 0

## 2022-12-06 NOTE — PATIENT INSTRUCTIONS
Celeston-Depo shot today  Diflucan for the yeast infection  Atarax for itching  Mammogram ordered  Work note for today  No scratching  Nothing hot to the skin  Back for wellness with a pap

## 2022-12-06 NOTE — LETTER
[address] [place] [id number] [place] [address]  Phone: [phone]  Fax: Arnel Tab Garces , APRN - CNP        December 6, 2022     Patient: Fredi Payton   YOB: 1965   Date of Visit: 12/6/2022       To Whom It May Concern: It is my medical opinion that Tammy Haddad may return to work on 12/07/2022. If you have any questions or concerns, please don't hesitate to call.     Sincerely,        Maria Elena Laureano, APRN - CNP

## 2022-12-06 NOTE — PROGRESS NOTES
After obtaining consent, and per orders of Scarlet Garcia CNP, injection of Celestone/Depo-Medrol 1.5 ml IM left gluteal by Jonathan Quintanilla RN. Patient tolerated and left after injection.

## 2022-12-06 NOTE — PROGRESS NOTES
[facility]  [address] [facility] [address]  Dept: [phone]  Loc: [phone]      Visit Date: 12/6/2022    Frances Alfredo is a 62 y.o. female who presents today for:  Chief Complaint   Patient presents with    Skin Problem     Rash all over body x 2 weeks, getting worse. Other     Discuss yeast infection. HPI:     Rash all over body x 2 weeks - no fevers or sore throat. Has not been sick. No bowel problems, urinating normally. No new exposure - no other household members has the rash. Also has a vaginal yeast infection - used Monistat - cleared for a few days then came back. Having white discharge, vaginal itching and burning.      HPI  Health Maintenance   Topic Date Due    HIV screen  Never done    Hepatitis C screen  Never done    Shingles vaccine (2 of 2) 01/10/2022    COVID-19 Vaccine (4 - Booster for Moderna series) 01/10/2022    Breast cancer screen  08/31/2022    Depression Screen  05/18/2023    Cervical cancer screen  06/28/2023    DTaP/Tdap/Td vaccine (2 - Td or Tdap) 06/25/2025    Colorectal Cancer Screen  03/28/2026    Lipids  05/31/2027    Flu vaccine  Completed    Hepatitis A vaccine  Aged Out    Hib vaccine  Aged Out    Meningococcal (ACWY) vaccine  Aged Out    Pneumococcal 0-64 years Vaccine  Aged Out     Past Medical History:   Diagnosis Date    Allergic rhinitis     Anxiety     Chronic leukopenia 11/12/2020    Migraine     Root canal space perforation       Past Surgical History:   Procedure Laterality Date    MOUTH SURGERY      2 root canals, wisdom teeth     Family History   Problem Relation Age of Onset    Arthritis Mother     Breast Cancer Mother 50    Heart Disease Father     Cancer Father         Tiffanie Tapia Cancer    High Cholesterol Father     Vision Loss Father     Cancer Paternal Aunt         Melenoma Cancer    Heart Disease Brother     Arthritis Maternal Grandmother     Breast Cancer Maternal Grandmother 79    Learning Disabilities Maternal Aunt     Diabetes Maternal Uncle     Substance Abuse Maternal Uncle     Breast Cancer Maternal Aunt 61    Breast Cancer Paternal Cousin 39        bilateral mastectomy (bilateral breast cancer)    Asthma Neg Hx     Birth Defects Neg Hx     Depression Neg Hx     Early Death Neg Hx     Hearing Loss Neg Hx     High Blood Pressure Neg Hx     Kidney Disease Neg Hx     Mental Retardation Neg Hx     Miscarriages / Stillbirths Neg Hx     Stroke Neg Hx     Mental Illness Neg Hx     Other Neg Hx      Social History     Tobacco Use    Smoking status: Never    Smokeless tobacco: Never   Substance Use Topics    Alcohol use: Yes     Alcohol/week: 0.0 standard drinks     Comment: socially      Current Outpatient Medications   Medication Sig Dispense Refill    losartan (COZAAR) 100 MG tablet take 1 tablet by mouth once daily 90 tablet 3    fluconazole (DIFLUCAN) 200 MG tablet Take 1 tablet by mouth daily for 7 days 7 tablet 0    hydrOXYzine HCl (ATARAX) 25 MG tablet Take 1 tablet by mouth every 8 hours as needed for Itching 15 tablet 0    triamcinolone (KENALOG) 0.1 % ointment Apply thin layer to affected area twice daily as needed for itching 1 Tube 0    Levocetirizine Dihydrochloride (XYZAL ALLERGY 24HR PO) Take by mouth (Patient not taking: No sig reported)      aspirin-acetaminophen-caffeine (EXCEDRIN MIGRAINE) 250-250-65 MG per tablet Take 1 tablet by mouth every 6 hours as needed for Headaches (Patient not taking: Reported on 12/6/2022)      acetaminophen 650 MG TABS Take 650 mg by mouth every 4 hours as needed for Pain (For mild pain level 1-3 or for fever > 100.5). (Patient not taking: Reported on 12/6/2022) 120 tablet      No current facility-administered medications for this visit.      Allergies   Allergen Reactions    Augmentin [Amoxicillin-Pot Clavulanate]      Yeast Infection    Avocado Hives    Sulfamethoxazole-Trimethoprim      Lip swelling       Subjective:    Review of Systems   Constitutional:  Negative for chills, fatigue and fever. HENT:  Negative for congestion, ear pain, postnasal drip, rhinorrhea and sore throat. Eyes:  Negative for discharge and redness. Respiratory:  Negative for cough and shortness of breath. Cardiovascular:  Negative for chest pain and leg swelling. Gastrointestinal:  Negative for abdominal distention, abdominal pain, anal bleeding, blood in stool, constipation, diarrhea and nausea. Genitourinary:  Positive for vaginal discharge. Vaginal itching and white discharge    Skin:  Positive for rash. Negative for color change. Neurological:  Negative for facial asymmetry, speech difficulty and weakness. Hematological:  Does not bruise/bleed easily. Psychiatric/Behavioral:  Negative for agitation and confusion. Objective:     Vitals:    12/06/22 0842   BP: 130/86   Site: Right Upper Arm   Pulse: 72   Resp: 16   Temp: 98.2 °F (36.8 °C)   TempSrc: Oral   Weight: 208 lb (94.3 kg)       Body mass index is 33.57 kg/m². BP Readings from Last 3 Encounters:   12/06/22 130/86   05/18/22 134/88   01/14/22 126/84     Physical Exam  Skin:     Findings: Rash (generalized rash consistent with hives) present. Lab Results   Component Value Date    WBC 3.5 (L) 10/21/2020    HGB 12.5 10/21/2020    HCT 38.9 10/21/2020     10/21/2020    CHOL 175 05/31/2022    TRIG 85 05/31/2022    HDL 64 05/31/2022    LDLCALC 94 05/31/2022    AST 14 05/31/2022     05/31/2022    K 4.4 05/31/2022     05/31/2022    CREATININE 0.78 05/31/2022    BUN 17 05/31/2022    CO2 26 05/31/2022    TSH 1.57 10/21/2020    LABGLOM >90 02/14/2013    CALCIUM 9.1 05/31/2022     Assessment:       Diagnosis Orders   1. Breast cancer screening by mammogram  BO DIGITAL SCREEN W OR WO CAD BILATERAL      2. Essential hypertension  losartan (COZAAR) 100 MG tablet      3.  Hives  betamethasone acetate-betamethasone sodium phosphate (CELESTONE) injection 6 mg methylPREDNISolone acetate (DEPO-MEDROL) injection 40 mg    hydrOXYzine HCl (ATARAX) 25 MG tablet      4. Vaginal yeast infection  fluconazole (DIFLUCAN) 200 MG tablet          Plan:   Celeston-Depo shot today  Diflucan for the yeast infection  Atarax for itching  Mammogram ordered  Work note for today  No scratching  Nothing hot to the skin  Back for wellness with a pap    Return in about 3 months (around 3/6/2023) for Annual Physical with a PAP. Orders Placed:  Orders Placed This Encounter   Procedures    BO DIGITAL SCREEN W OR WO CAD BILATERAL     Medications Prescribed:  Orders Placed This Encounter   Medications    losartan (COZAAR) 100 MG tablet     Sig: take 1 tablet by mouth once daily     Dispense:  90 tablet     Refill:  3    betamethasone acetate-betamethasone sodium phosphate (CELESTONE) injection 6 mg    methylPREDNISolone acetate (DEPO-MEDROL) injection 40 mg    fluconazole (DIFLUCAN) 200 MG tablet     Sig: Take 1 tablet by mouth daily for 7 days     Dispense:  7 tablet     Refill:  0    hydrOXYzine HCl (ATARAX) 25 MG tablet     Sig: Take 1 tablet by mouth every 8 hours as needed for Itching     Dispense:  15 tablet     Refill:  0     Future Appointments   Date Time Provider [place]   12/20/2022  3:30 PM Pablo Denny MD [address]   3/6/2023  8:00 AM Pablo Denny MD [address]      Patient given educational materials - see patient instructions. Discussed use, benefit, and side effects of prescribedmedications. All patient questions answered. Pt voiced understanding. Reviewed health maintenance. Instructed to continue current medications, diet and exercise. Patient agreed with treatment plan. Follow up as directed.     Electronically signed by CE Young CNP on 12/6/2022 at 10:22 AM

## 2022-12-20 ENCOUNTER — OFFICE VISIT (OUTPATIENT)
Dept: FAMILY MEDICINE CLINIC | Age: 57
End: 2022-12-20
Payer: COMMERCIAL

## 2022-12-20 VITALS
WEIGHT: 206 LBS | HEIGHT: 66 IN | HEART RATE: 76 BPM | DIASTOLIC BLOOD PRESSURE: 78 MMHG | BODY MASS INDEX: 33.11 KG/M2 | SYSTOLIC BLOOD PRESSURE: 126 MMHG | RESPIRATION RATE: 16 BRPM

## 2022-12-20 DIAGNOSIS — L23.9 ALLERGIC DERMATITIS: ICD-10-CM

## 2022-12-20 DIAGNOSIS — Z01.419 ENCOUNTER FOR GYNECOLOGICAL EXAMINATION WITHOUT ABNORMAL FINDING: Primary | ICD-10-CM

## 2022-12-20 PROCEDURE — 3078F DIAST BP <80 MM HG: CPT | Performed by: FAMILY MEDICINE

## 2022-12-20 PROCEDURE — 3074F SYST BP LT 130 MM HG: CPT | Performed by: FAMILY MEDICINE

## 2022-12-20 PROCEDURE — G8482 FLU IMMUNIZE ORDER/ADMIN: HCPCS | Performed by: FAMILY MEDICINE

## 2022-12-20 PROCEDURE — 99396 PREV VISIT EST AGE 40-64: CPT | Performed by: FAMILY MEDICINE

## 2022-12-20 RX ORDER — LEVOCETIRIZINE DIHYDROCHLORIDE 5 MG/1
5 TABLET, FILM COATED ORAL NIGHTLY
Qty: 30 TABLET | Refills: 11 | Status: SHIPPED | OUTPATIENT
Start: 2022-12-20

## 2022-12-20 ASSESSMENT — ENCOUNTER SYMPTOMS
EYES NEGATIVE: 1
VOMITING: 0
ABDOMINAL PAIN: 0
BLOOD IN STOOL: 0
DIARRHEA: 0
SHORTNESS OF BREATH: 0
NAUSEA: 0

## 2022-12-20 NOTE — PROGRESS NOTES
2022    Chief Complaint   Patient presents with    Annual Exam     Wants to discuss allergies. Medication has helped. Gynecologic Exam     Here for pap       Tomi Goldberg (:  1965) is a 62 y.o. female, here for a preventive medicine evaluation. Patient Active Problem List   Diagnosis    Essential hypertension    Class 1 obesity due to excess calories with serious comorbidity and body mass index (BMI) of 33.0 to 33.9 in adult    Chronic leukopenia     Patient doing well in general.  She states that her urticaria and rash has been improved with steroids and antihistamines. She is concerned that the rash may be caused by exposure to animals. As her rash is improved at this point she is not interested in allergy testing. Her blood pressures been stable. She takes her prescribed medications as directed and denies side effects. No recent illnesses or hospitalizations. She is due for follow-up Pap today. She has a history of ASCUS on her last Pap. She denies any vaginal discharge, bleeding, or spotting. Non-smoker. BMI 33.25. Review of Systems   Constitutional:  Negative for chills, fatigue and fever. HENT: Negative. Eyes: Negative. Respiratory:  Negative for shortness of breath. Cardiovascular:  Negative for chest pain, palpitations and leg swelling. Gastrointestinal:  Negative for abdominal pain, blood in stool, diarrhea, nausea and vomiting. Genitourinary:  Negative for dysuria. Musculoskeletal:  Negative for arthralgias and myalgias. Skin:  Positive for rash (improved). Neurological:  Negative for dizziness and headaches. Hematological:  Negative for adenopathy. Psychiatric/Behavioral: Negative. All other systems reviewed and are negative. Prior to Visit Medications    Medication Sig Taking?  Authorizing Provider   levocetirizine (XYZAL ALLERGY 24HR) 5 MG tablet Take 1 tablet by mouth nightly Yes Billy Eisenberg MD   triamcinolone (KENALOG) 0.1 % ointment Apply thin layer to affected area twice daily as needed for itching Yes Hayley Eckert MD   losartan (COZAAR) 100 MG tablet take 1 tablet by mouth once daily Yes Constantino Ortega APRN - CNP   aspirin-acetaminophen-caffeine (Bal Hopping) 084-243-93 MG per tablet Take 1 tablet by mouth every 6 hours as needed for Headaches Yes Historical Provider, MD   acetaminophen 650 MG TABS Take 650 mg by mouth every 4 hours as needed for Pain (For mild pain level 1-3 or for fever > 100.5) Yes Ruma Castañeda MD        Allergies   Allergen Reactions    Augmentin [Amoxicillin-Pot Clavulanate]      Yeast Infection    Avocado Hives    Sulfamethoxazole-Trimethoprim      Lip swelling       Past Medical History:   Diagnosis Date    Allergic rhinitis     Anxiety     Chronic leukopenia 11/12/2020    Migraine     Root canal space perforation        Past Surgical History:   Procedure Laterality Date    MOUTH SURGERY      2 root canals, wisdom teeth       Social History     Socioeconomic History    Marital status:      Spouse name: Not on file    Number of children: Not on file    Years of education: Not on file    Highest education level: Not on file   Occupational History    Not on file   Tobacco Use    Smoking status: Never    Smokeless tobacco: Never   Substance and Sexual Activity    Alcohol use:  Yes     Alcohol/week: 0.0 standard drinks     Comment: socially    Drug use: No    Sexual activity: Yes   Other Topics Concern    Not on file   Social History Narrative    Not on file     Social Determinants of Health     Financial Resource Strain: Low Risk     Difficulty of Paying Living Expenses: Not hard at all   Food Insecurity: No Food Insecurity    Worried About Running Out of Food in the Last Year: Never true    Ran Out of Food in the Last Year: Never true   Transportation Needs: Not on file   Physical Activity: Not on file   Stress: Not on file   Social Connections: Not on file   Intimate Partner Violence: Not on file   Housing Stability: Not on file        Family History   Problem Relation Age of Onset    Arthritis Mother     Breast Cancer Mother 50    Heart Disease Father     Cancer Father         Paulette Rosaline Cancer    High Cholesterol Father     Vision Loss Father     Cancer Paternal [address] Cancer    Heart Disease Brother     Arthritis Maternal Grandmother     Breast Cancer Maternal Grandmother 79    Learning Disabilities Maternal Aunt     Diabetes Maternal Uncle     Substance Abuse Maternal Uncle     Breast Cancer Maternal Aunt 61    Breast Cancer Paternal Cousin 39        bilateral mastectomy (bilateral breast cancer)    Asthma Neg Hx     Birth Defects Neg Hx     Depression Neg Hx     Early Death Neg Hx     Hearing Loss Neg Hx     High Blood Pressure Neg Hx     Kidney Disease Neg Hx     Mental Retardation Neg Hx     Miscarriages / Stillbirths Neg Hx     Stroke Neg Hx     Mental Illness Neg Hx     Other Neg Hx        ADVANCE DIRECTIVE: N, <no information>    Vitals:    12/20/22 1520   BP: 126/78   Pulse: 76   Resp: 16   Weight: 206 lb (93.4 kg)   Height: 5' 6\" (1.676 m)     Estimated body mass index is 33.25 kg/m² as calculated from the following:    Height as of this encounter: 5' 6\" (1.676 m). Weight as of this encounter: 206 lb (93.4 kg). Physical Exam  Vitals and nursing note reviewed. Constitutional:       General: She is not in acute distress. Appearance: She is well-developed. HENT:      Head: Normocephalic and atraumatic. Right Ear: Tympanic membrane normal.      Left Ear: Tympanic membrane normal.      Mouth/Throat:      Mouth: Mucous membranes are moist.      Pharynx: No posterior oropharyngeal erythema. Eyes:      Conjunctiva/sclera: Conjunctivae normal.   Neck:      Thyroid: No thyromegaly. Vascular: No carotid bruit. Cardiovascular:      Rate and Rhythm: Normal rate and regular rhythm. Heart sounds: No murmur heard.   Pulmonary: Effort: Pulmonary effort is normal.      Breath sounds: Normal breath sounds. No wheezing. Abdominal:      General: Bowel sounds are normal.      Palpations: Abdomen is soft. Tenderness: There is no abdominal tenderness. There is no guarding or rebound. Genitourinary:     Comments: External genitalia normal.  Spec exam reveals parous cervical os without lesion. Pap  Collected. Bimanual exam within normal limits. Musculoskeletal:      Cervical back: Neck supple. Right lower leg: No edema. Left lower leg: No edema. Lymphadenopathy:      Cervical: No cervical adenopathy. Skin:     General: Skin is warm and dry. Findings: No rash. Neurological:      Mental Status: She is alert and oriented to person, place, and time.    Psychiatric:         Behavior: Behavior normal.       Lab Results   Component Value Date    CHOL 175 05/31/2022    TRIG 85 05/31/2022    HDL 64 05/31/2022    LDLCALC 94 05/31/2022     Lab Results   Component Value Date     05/31/2022    K 4.4 05/31/2022     05/31/2022    CO2 26 05/31/2022    BUN 17 05/31/2022    CREATININE 0.78 05/31/2022    GLUCOSE 92 05/31/2022    CALCIUM 9.1 05/31/2022    PROT 7.3 05/31/2022    LABALBU 3.9 05/31/2022    BILITOT 0.2 05/31/2022    ALKPHOS 76 05/31/2022    AST 14 05/31/2022    ALT 11 05/31/2022    LABGLOM >90 02/14/2013           Immunization History   Administered Date(s) Administered    COVID-19, MODERNA BLUE border, Primary or Immunocompromised, (age 12y+), IM, 100 mcg/0.5mL 03/09/2021, 04/06/2021, 11/15/2021    Influenza 09/27/2013    Influenza Virus Vaccine 02/15/2013, 10/01/2014, 10/06/2015    Influenza, AFLURIA (age 1 yrs+), FLUZONE, (age 10 mo+), MDV, 0.5mL 09/16/2021    Influenza, FLUARIX, FLULAVAL, FLUZONE (age 10 mo+) AND AFLURIA, (age 1 y+), PF, 0.5mL 10/10/2018, 12/19/2019, 09/17/2020, 09/16/2021, 09/15/2022    Influenza, Triv, 3 Years and older, IM (Afluria (5 yrs and older) 10/20/2016    Tdap (Boostrix, Adacel) 06/25/2015    Zoster Recombinant (Shingrix) 11/15/2021       Health Maintenance   Topic Date Due    HIV screen  Never done    Hepatitis C screen  Never done    Shingles vaccine (2 of 2) 01/10/2022    COVID-19 Vaccine (4 - Booster for Moderna series) 01/10/2022    Breast cancer screen  08/31/2022    Depression Screen  05/18/2023    Cervical cancer screen  06/28/2023    DTaP/Tdap/Td vaccine (2 - Td or Tdap) 06/25/2025    Colorectal Cancer Screen  03/28/2026    Lipids  05/31/2027    Flu vaccine  Completed    Hepatitis A vaccine  Aged Out    Hib vaccine  Aged Out    Meningococcal (ACWY) vaccine  Aged Out    Pneumococcal 0-64 years Vaccine  Aged Out       Assessment & Plan     1. Encounter for gynecological examination without abnormal finding  -     PAP SMEAR  2. Allergic dermatitis    Pap sent to lab. We will notify her of results once available    Continue antihistamines for allergic dermatitis.   She will notify me if she would like to consider a referral to an allergist for further evaluation    She is encouraged to continue yearly mammograms    Healthy diet and regular activity recommended to reduce weight    Follow-up yearly and as needed         --Cleo Dean MD

## 2023-03-06 ENCOUNTER — OFFICE VISIT (OUTPATIENT)
Dept: FAMILY MEDICINE CLINIC | Age: 58
End: 2023-03-06
Payer: COMMERCIAL

## 2023-03-06 VITALS
SYSTOLIC BLOOD PRESSURE: 134 MMHG | WEIGHT: 205.6 LBS | DIASTOLIC BLOOD PRESSURE: 84 MMHG | HEART RATE: 80 BPM | BODY MASS INDEX: 33.18 KG/M2 | RESPIRATION RATE: 16 BRPM

## 2023-03-06 DIAGNOSIS — F41.9 ANXIETY: ICD-10-CM

## 2023-03-06 DIAGNOSIS — J30.9 ALLERGIC RHINITIS, UNSPECIFIED SEASONALITY, UNSPECIFIED TRIGGER: Primary | ICD-10-CM

## 2023-03-06 PROCEDURE — 3079F DIAST BP 80-89 MM HG: CPT | Performed by: FAMILY MEDICINE

## 2023-03-06 PROCEDURE — 1036F TOBACCO NON-USER: CPT | Performed by: FAMILY MEDICINE

## 2023-03-06 PROCEDURE — 3075F SYST BP GE 130 - 139MM HG: CPT | Performed by: FAMILY MEDICINE

## 2023-03-06 PROCEDURE — 99213 OFFICE O/P EST LOW 20 MIN: CPT | Performed by: FAMILY MEDICINE

## 2023-03-06 PROCEDURE — G8427 DOCREV CUR MEDS BY ELIG CLIN: HCPCS | Performed by: FAMILY MEDICINE

## 2023-03-06 PROCEDURE — 3017F COLORECTAL CA SCREEN DOC REV: CPT | Performed by: FAMILY MEDICINE

## 2023-03-06 PROCEDURE — G8417 CALC BMI ABV UP PARAM F/U: HCPCS | Performed by: FAMILY MEDICINE

## 2023-03-06 PROCEDURE — G8482 FLU IMMUNIZE ORDER/ADMIN: HCPCS | Performed by: FAMILY MEDICINE

## 2023-03-06 RX ORDER — TRIAMCINOLONE ACETONIDE 55 UG/1
2 SPRAY, METERED NASAL DAILY
Qty: 1 EACH | Refills: 5 | Status: SHIPPED | OUTPATIENT
Start: 2023-03-06

## 2023-03-06 SDOH — ECONOMIC STABILITY: FOOD INSECURITY: WITHIN THE PAST 12 MONTHS, YOU WORRIED THAT YOUR FOOD WOULD RUN OUT BEFORE YOU GOT MONEY TO BUY MORE.: NEVER TRUE

## 2023-03-06 SDOH — ECONOMIC STABILITY: INCOME INSECURITY: HOW HARD IS IT FOR YOU TO PAY FOR THE VERY BASICS LIKE FOOD, HOUSING, MEDICAL CARE, AND HEATING?: NOT HARD AT ALL

## 2023-03-06 SDOH — ECONOMIC STABILITY: FOOD INSECURITY: WITHIN THE PAST 12 MONTHS, THE FOOD YOU BOUGHT JUST DIDN'T LAST AND YOU DIDN'T HAVE MONEY TO GET MORE.: NEVER TRUE

## 2023-03-06 SDOH — ECONOMIC STABILITY: HOUSING INSECURITY
IN THE LAST 12 MONTHS, WAS THERE A TIME WHEN YOU DID NOT HAVE A STEADY PLACE TO SLEEP OR SLEPT IN A SHELTER (INCLUDING NOW)?: NO

## 2023-03-06 ASSESSMENT — ENCOUNTER SYMPTOMS
COUGH: 0
SINUS PRESSURE: 1
EYE DISCHARGE: 1
RHINORRHEA: 1
GASTROINTESTINAL NEGATIVE: 1
EYE ITCHING: 1

## 2023-03-06 NOTE — PROGRESS NOTES
3/6/2023    Xavi Salgado (:  1965) is a 62 y.o. female, Established patient, here for evaluation of the following chief complaint(s):  3 Month Follow-Up (Pt has been having a lot of stress at work and has been getting sick more frequently and would like to discuss.)      ASSESSMENT/PLAN:    1. Allergic rhinitis, unspecified seasonality, unspecified trigger  -     triamcinolone (NASACORT) 55 MCG/ACT nasal inhaler; 2 sprays by Each Nostril route daily, Disp-1 each, R-5Normal  2. Anxiety    Add Nasacort 2 sprays each nostril daily for better control of allergies    Continue Xyzal daily    Consider allergy referral if her symptoms or not improved    Vitamins for immune support recommended including vitamin C, vitamin D, and zinc.  She may also consider an elderberry supplement. Good self-care recommended including adequate rest, healthy diet, and regular exercise to help with mental and physical health    Follow-up if not improved    SUBJECTIVE/OBJECTIVE:    HPI    Patient today with recurrent symptoms of postnasal drip, head congestion, and rhinorrhea. She has occasional sneezing and itchy watery eyes. No current fever, chills, or sweats. No body aches. She reports being under more stress recently with work and home issues. 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 She is wondering what she can do to help prevent respiratory illnesses. She is currently taking Xyzal daily. Her blood pressures have been stable. She takes her prescribed medications as directed and denies side effects. No recent hospitalizations. Non-smoker. BMI 33. 18. Review of Systems   Constitutional:  Negative for fever. HENT:  Positive for congestion, rhinorrhea, sinus pressure and sneezing. Negative for ear pain. Eyes:  Positive for discharge and itching. Respiratory:  Negative for cough. Cardiovascular: Negative. Gastrointestinal: Negative. Genitourinary: Negative. Musculoskeletal: Negative. Psychiatric/Behavioral:          Increased stress   All other systems reviewed and are negative. Vitals:    03/06/23 0805   BP: 134/84   Pulse: 80   Resp: 16   Weight: 205 lb 9.6 oz (93.3 kg)       Wt Readings from Last 3 Encounters:   03/06/23 205 lb 9.6 oz (93.3 kg)   12/20/22 206 lb (93.4 kg)   12/06/22 208 lb (94.3 kg)       BP Readings from Last 3 Encounters:   03/06/23 134/84   12/20/22 126/78   12/06/22 130/86       Physical Exam  Vitals reviewed. Constitutional:       General: She is not in acute distress. Appearance: She is well-developed. HENT:      Head: Normocephalic and atraumatic. Right Ear: Tympanic membrane normal.      Left Ear: Tympanic membrane normal.      Nose: Congestion and rhinorrhea (clear) present. Mouth/Throat:      Mouth: Mucous membranes are moist.      Pharynx: No posterior oropharyngeal erythema. Eyes:      Conjunctiva/sclera:      Right eye: Right conjunctiva is injected. Left eye: Left conjunctiva is injected. Cardiovascular:      Rate and Rhythm: Normal rate and regular rhythm. Heart sounds: No murmur heard. Pulmonary:      Breath sounds: Normal breath sounds. No wheezing. Musculoskeletal:      Right lower leg: No edema. Left lower leg: No edema. Lymphadenopathy:      Cervical: No cervical adenopathy. Neurological:      Mental Status: She is alert. An electronic signature was used to authenticate this note.         Electronically signed by Judge Lindsey MD on 3/6/2023 at 8:41 AM

## 2023-09-08 ENCOUNTER — IMMUNIZATION (OUTPATIENT)
Age: 58
End: 2023-09-08

## 2023-09-08 DIAGNOSIS — Z23 ENCOUNTER FOR IMMUNIZATION: Primary | ICD-10-CM

## 2023-09-11 NOTE — PROGRESS NOTES
Patient requests flu vaccine; consent form obtained; denies fever, egg allergy nor past reactions to any injection or immunization. Immunization given per protocol and recorded in [address]. VIS information sheet given, explained possible S/E. Reviewed sx indicating need to be seen in ER. Pt had no adverse reaction at time of discharge.   Tracie Rae RN

## 2024-02-25 DIAGNOSIS — Z00.00 ANNUAL PHYSICAL EXAM: Primary | ICD-10-CM

## 2024-02-25 DIAGNOSIS — I10 ESSENTIAL HYPERTENSION: ICD-10-CM

## 2024-02-26 RX ORDER — LOSARTAN POTASSIUM 100 MG/1
TABLET ORAL
Qty: 30 TABLET | Refills: 0 | Status: SHIPPED | OUTPATIENT
Start: 2024-02-26

## 2024-02-26 NOTE — TELEPHONE ENCOUNTER
Rx EP'd to pharmacy.  Please notify patient.      Requested Prescriptions     Signed Prescriptions Disp Refills    losartan (COZAAR) 100 MG tablet 30 tablet 0     Sig: take 1 tablet by mouth once daily     Authorizing Provider: CEM RIVERS for annual physical soon.  Lab orders pended.      Electronically signed by Cem Rivers MD on 2/26/2024 at 10:44 AM

## 2024-02-26 NOTE — TELEPHONE ENCOUNTER
Request sent from [facility] pharmacy for refill of losartan 100 mg qd.  Last seen 3/6/23, no future appt scheduled.  Ok for short term supply until pt can be reached to set up appt? Due for labs also.

## 2024-04-25 ENCOUNTER — TELEPHONE (OUTPATIENT)
Dept: FAMILY MEDICINE CLINIC | Age: 59
End: 2024-04-25

## 2024-04-25 NOTE — TELEPHONE ENCOUNTER
----- Message from Princess Gera Fournier sent at 4/25/2024  1:42 PM EDT -----  Regarding: ECC Escalation To Practice  ECC Escalation To Practice      Type of Escalation: Red Flag Symptom  --------------------------------------------------------------------------------------------------------------------------    Information for Provider:  Patient is looking for appointment for: Symptom High Blood Pressure  Reasons for Message: Unable to reach practice     Additional Information : High Blood pressure and do not want to go to an urgent care--------------------------------------------------------------------------------------------------------------------------    Relationship to Patient: Self     Call Back Info: OK to leave message on voicemail  Preferred Call Back Number: [phone]

## 2024-04-25 NOTE — TELEPHONE ENCOUNTER
Pt had a high BP reading today and was told that if the BP did not go down she would have to go to the ED. Pt's BP did go down and she was wanting to set up an appt to be seen once back in [facility]. Pt is not in [place], but she will be back in [place] in June and wanted an appt scheduled. Pt scheduled with CG on 6/5/24 @ 8AM.

## 2024-06-05 ENCOUNTER — OFFICE VISIT (OUTPATIENT)
Dept: FAMILY MEDICINE CLINIC | Age: 59
End: 2024-06-05
Payer: COMMERCIAL

## 2024-06-05 VITALS
WEIGHT: 197 LBS | HEIGHT: 66 IN | HEART RATE: 74 BPM | RESPIRATION RATE: 16 BRPM | TEMPERATURE: 98 F | DIASTOLIC BLOOD PRESSURE: 84 MMHG | BODY MASS INDEX: 31.66 KG/M2 | SYSTOLIC BLOOD PRESSURE: 130 MMHG

## 2024-06-05 DIAGNOSIS — Z12.31 ENCOUNTER FOR SCREENING MAMMOGRAM FOR MALIGNANT NEOPLASM OF BREAST: ICD-10-CM

## 2024-06-05 DIAGNOSIS — I10 ESSENTIAL HYPERTENSION: Primary | ICD-10-CM

## 2024-06-05 DIAGNOSIS — L23.9 ALLERGIC DERMATITIS: ICD-10-CM

## 2024-06-05 LAB
ALBUMIN: 4.1 G/DL (ref 3.5–5.2)
ALK PHOSPHATASE: 73 U/L (ref 40–136)
ALT SERPL-CCNC: 12 U/L (ref 5–40)
ANION GAP SERPL CALCULATED.3IONS-SCNC: 10 MEQ/L (ref 7–16)
AST SERPL-CCNC: 17 U/L (ref 9–40)
BILIRUB SERPL-MCNC: 0.3 MG/DL
BUN BLDV-MCNC: 17 MG/DL (ref 6–20)
CALCIUM SERPL-MCNC: 9.5 MG/DL (ref 8.5–10.5)
CHLORIDE BLD-SCNC: 106 MEQ/L (ref 95–107)
CHOLESTEROL, TOTAL: 175 MG/DL
CHOLESTEROL/HDL RATIO: 2.7 RATIO
CO2: 24 MEQ/L (ref 19–31)
CREAT SERPL-MCNC: 0.88 MG/DL (ref 0.6–1.3)
EGFR IF NONAFRICAN AMERICAN: 76 ML/MIN/1.73
GLUCOSE: 84 MG/DL (ref 70–99)
HDLC SERPL-MCNC: 66 MG/DL
LDL CHOLESTEROL: 94 MG/DL
LDL/HDL RATIO: 1.4 RATIO
POTASSIUM SERPL-SCNC: 4.7 MEQ/L (ref 3.5–5.4)
SODIUM BLD-SCNC: 140 MEQ/L (ref 133–146)
TOTAL PROTEIN: 7.7 G/DL (ref 6.1–8.3)
TRIGL SERPL-MCNC: 75 MG/DL
VLDLC SERPL CALC-MCNC: 15 MG/DL

## 2024-06-05 PROCEDURE — 99214 OFFICE O/P EST MOD 30 MIN: CPT | Performed by: FAMILY MEDICINE

## 2024-06-05 PROCEDURE — 3079F DIAST BP 80-89 MM HG: CPT | Performed by: FAMILY MEDICINE

## 2024-06-05 PROCEDURE — 3075F SYST BP GE 130 - 139MM HG: CPT | Performed by: FAMILY MEDICINE

## 2024-06-05 RX ORDER — HYDROCHLOROTHIAZIDE 12.5 MG/1
12.5 CAPSULE, GELATIN COATED ORAL DAILY
COMMUNITY

## 2024-06-05 RX ORDER — LOSARTAN POTASSIUM 100 MG/1
100 TABLET ORAL DAILY
Qty: 90 TABLET | Refills: 1 | Status: SHIPPED | OUTPATIENT
Start: 2024-06-05

## 2024-06-05 RX ORDER — TRIAMCINOLONE ACETONIDE 1 MG/G
OINTMENT TOPICAL
Qty: 30 G | Refills: 1 | Status: SHIPPED | OUTPATIENT
Start: 2024-06-05

## 2024-06-05 SDOH — ECONOMIC STABILITY: FOOD INSECURITY: WITHIN THE PAST 12 MONTHS, THE FOOD YOU BOUGHT JUST DIDN'T LAST AND YOU DIDN'T HAVE MONEY TO GET MORE.: NEVER TRUE

## 2024-06-05 SDOH — ECONOMIC STABILITY: FOOD INSECURITY: WITHIN THE PAST 12 MONTHS, YOU WORRIED THAT YOUR FOOD WOULD RUN OUT BEFORE YOU GOT MONEY TO BUY MORE.: NEVER TRUE

## 2024-06-05 SDOH — ECONOMIC STABILITY: INCOME INSECURITY: HOW HARD IS IT FOR YOU TO PAY FOR THE VERY BASICS LIKE FOOD, HOUSING, MEDICAL CARE, AND HEATING?: NOT HARD AT ALL

## 2024-06-05 ASSESSMENT — PATIENT HEALTH QUESTIONNAIRE - PHQ9
SUM OF ALL RESPONSES TO PHQ QUESTIONS 1-9: 0
2. FEELING DOWN, DEPRESSED OR HOPELESS: NOT AT ALL
SUM OF ALL RESPONSES TO PHQ9 QUESTIONS 1 & 2: 0
1. LITTLE INTEREST OR PLEASURE IN DOING THINGS: NOT AT ALL

## 2024-06-05 ASSESSMENT — ENCOUNTER SYMPTOMS
VOMITING: 0
SHORTNESS OF BREATH: 0
BLOOD IN STOOL: 0
DIARRHEA: 0
NAUSEA: 0
ABDOMINAL PAIN: 0
EYES NEGATIVE: 1

## 2024-06-05 NOTE — PROGRESS NOTES
2024      Piedad Wu (:  1965) is a 58 y.o. female,Established patient, here for evaluation of the following chief complaint(s):  Blood Pressure Check (Pt here for blood pressure, bp has been a little high. C/o she is relocating and states she can't sleep at night. )        ASSESSMENT/PLAN     1. Essential hypertension  -     losartan (COZAAR) 100 MG tablet; Take 1 tablet by mouth daily, Disp-90 tablet, R-1Normal  -     Comprehensive Metabolic Panel; Future  -     Lipid Panel; Future  2. Allergic dermatitis  -     triamcinolone (KENALOG) 0.1 % ointment; Apply thin layer to affected area twice daily as needed for itching, Disp-30 g, R-1, Normal  3. Encounter for screening mammogram for malignant neoplasm of breast  -     [facility] BRETT DIGITAL SCREEN BILATERAL; Future    Continue losartan 100 mg daily for hypertension.  Med refill as above.  She will also fill the prescription for hydrochlorothiazide 12.5 mg daily as prescribed by her cardiologist.  This should further improve her blood pressure.    Refill given of Kenalog 0.1% ointment to be used as needed for allergic dermatitis.    Order given for yearly mammogram    Labs as above    She is encouraged to establish care as soon as possible with a PCP in [place], where she is recently relocated    SUBJECTIVE     Piedad Wu is a 58 y.o.female      Here for follow up of chronic health problems including:    Patient Active Problem List   Diagnosis    Essential hypertension    Class 1 obesity due to excess calories with serious comorbidity and body mass index (BMI) of 33.0 to 33.9 in adult    Chronic leukopenia     Patient here today for follow up of HTN.  She relocated to [place], [place] and got a new job there.  She was noticing headaches and the school nurse checked her BP and it was high.  She saw a cardiologist and he prescribed HCTZ 12.5mg daily and she hasn't filled it yet.  She continues on losartan 100mg daily.  She admits that

## 2024-06-18 ENCOUNTER — APPOINTMENT (OUTPATIENT)
Dept: CT IMAGING | Age: 59
End: 2024-06-18
Payer: COMMERCIAL

## 2024-06-18 ENCOUNTER — APPOINTMENT (OUTPATIENT)
Dept: GENERAL RADIOLOGY | Age: 59
End: 2024-06-18
Payer: COMMERCIAL

## 2024-06-18 ENCOUNTER — HOSPITAL ENCOUNTER (EMERGENCY)
Age: 59
Discharge: HOME | End: 2024-06-18
Attending: EMERGENCY MEDICINE
Payer: COMMERCIAL

## 2024-06-18 VITALS
WEIGHT: 197 LBS | SYSTOLIC BLOOD PRESSURE: 112 MMHG | HEART RATE: 74 BPM | RESPIRATION RATE: 15 BRPM | OXYGEN SATURATION: 100 % | TEMPERATURE: 97.5 F | BODY MASS INDEX: 31.8 KG/M2 | DIASTOLIC BLOOD PRESSURE: 95 MMHG

## 2024-06-18 DIAGNOSIS — R55 SYNCOPE, UNSPECIFIED SYNCOPE TYPE: Primary | ICD-10-CM

## 2024-06-18 LAB
ANION GAP SERPL CALC-SCNC: 13 MEQ/L (ref 8–16)
BASOPHILS ABSOLUTE: 0 THOU/MM3 (ref 0–0.1)
BASOPHILS NFR BLD AUTO: 0 %
BUN SERPL-MCNC: 20 MG/DL (ref 7–22)
CALCIUM SERPL-MCNC: 9.4 MG/DL (ref 8.5–10.5)
CHLORIDE SERPL-SCNC: 102 MEQ/L (ref 98–111)
CO2 SERPL-SCNC: 24 MEQ/L (ref 23–33)
CREAT SERPL-MCNC: 1.1 MG/DL (ref 0.4–1.2)
D DIMER PPP IA.FEU-MCNC: 532 NG/ML FEU (ref 0–500)
DEPRECATED RDW RBC AUTO: 43 FL (ref 35–45)
EKG ATRIAL RATE: 65 BPM
EKG P AXIS: 55 DEGREES
EKG P-R INTERVAL: 184 MS
EKG Q-T INTERVAL: 432 MS
EKG QRS DURATION: 82 MS
EKG QTC CALCULATION (BAZETT): 449 MS
EKG R AXIS: -7 DEGREES
EKG T AXIS: 39 DEGREES
EKG VENTRICULAR RATE: 65 BPM
EOSINOPHIL NFR BLD AUTO: 0 %
EOSINOPHILS ABSOLUTE: 0 THOU/MM3 (ref 0–0.4)
ERYTHROCYTE [DISTWIDTH] IN BLOOD BY AUTOMATED COUNT: 14.8 % (ref 11.5–14.5)
GFR SERPL CREATININE-BSD FRML MDRD: 58 ML/MIN/1.73M2
GLUCOSE SERPL-MCNC: 108 MG/DL (ref 70–108)
HCT VFR BLD AUTO: 40.9 % (ref 37–47)
HGB BLD-MCNC: 12.8 GM/DL (ref 12–16)
IMM GRANULOCYTES # BLD AUTO: 0 THOU/MM3 (ref 0–0.07)
IMM GRANULOCYTES NFR BLD AUTO: 0 %
LYMPHOCYTES ABSOLUTE: 1.3 THOU/MM3 (ref 1–4.8)
LYMPHOCYTES NFR BLD AUTO: 32.2 %
MCH RBC QN AUTO: 25.3 PG (ref 26–33)
MCHC RBC AUTO-ENTMCNC: 31.3 GM/DL (ref 32.2–35.5)
MCV RBC AUTO: 80.8 FL (ref 81–99)
MONOCYTES ABSOLUTE: 0.4 THOU/MM3 (ref 0.4–1.3)
MONOCYTES NFR BLD AUTO: 10 %
NEUTROPHILS ABSOLUTE: 2.4 THOU/MM3 (ref 1.8–7.7)
NEUTROPHILS NFR BLD AUTO: 57.8 %
NRBC BLD AUTO-RTO: 0 /100 WBC
OSMOLALITY SERPL CALC.SUM OF ELEC: 280.7 MOSMOL/KG (ref 275–300)
PLATELET # BLD AUTO: 310 THOU/MM3 (ref 130–400)
PMV BLD AUTO: 10.1 FL (ref 9.4–12.4)
POTASSIUM SERPL-SCNC: 3.9 MEQ/L (ref 3.5–5.2)
RBC # BLD AUTO: 5.06 MILL/MM3 (ref 4.2–5.4)
REASON FOR REJECTION: NORMAL
REJECTED TEST: NORMAL
SODIUM SERPL-SCNC: 139 MEQ/L (ref 135–145)
TROPONIN, HIGH SENSITIVITY: < 6 NG/L (ref 0–12)
WBC # BLD AUTO: 4.1 THOU/MM3 (ref 4.8–10.8)

## 2024-06-18 PROCEDURE — 93010 ELECTROCARDIOGRAM REPORT: CPT | Performed by: INTERNAL MEDICINE

## 2024-06-18 PROCEDURE — 80048 BASIC METABOLIC PNL TOTAL CA: CPT

## 2024-06-18 PROCEDURE — 71275 CT ANGIOGRAPHY CHEST: CPT

## 2024-06-18 PROCEDURE — 96361 HYDRATE IV INFUSION ADD-ON: CPT

## 2024-06-18 PROCEDURE — 85379 FIBRIN DEGRADATION QUANT: CPT

## 2024-06-18 PROCEDURE — 85025 COMPLETE CBC W/AUTO DIFF WBC: CPT

## 2024-06-18 PROCEDURE — 6360000004 HC RX CONTRAST MEDICATION: Performed by: INTERNAL MEDICINE

## 2024-06-18 PROCEDURE — 71045 X-RAY EXAM CHEST 1 VIEW: CPT

## 2024-06-18 PROCEDURE — 93005 ELECTROCARDIOGRAM TRACING: CPT | Performed by: EMERGENCY MEDICINE

## 2024-06-18 PROCEDURE — 84484 ASSAY OF TROPONIN QUANT: CPT

## 2024-06-18 PROCEDURE — 36415 COLL VENOUS BLD VENIPUNCTURE: CPT

## 2024-06-18 PROCEDURE — 99285 EMERGENCY DEPT VISIT HI MDM: CPT

## 2024-06-18 PROCEDURE — 96360 HYDRATION IV INFUSION INIT: CPT

## 2024-06-18 PROCEDURE — 2580000003 HC RX 258: Performed by: INTERNAL MEDICINE

## 2024-06-18 PROCEDURE — 2580000003 HC RX 258: Performed by: EMERGENCY MEDICINE

## 2024-06-18 RX ORDER — 0.9 % SODIUM CHLORIDE 0.9 %
500 INTRAVENOUS SOLUTION INTRAVENOUS ONCE
Status: COMPLETED | OUTPATIENT
Start: 2024-06-18 | End: 2024-06-18

## 2024-06-18 RX ADMIN — SODIUM CHLORIDE 500 ML: 9 INJECTION, SOLUTION INTRAVENOUS at 15:01

## 2024-06-18 RX ADMIN — SODIUM CHLORIDE 500 ML: 9 INJECTION, SOLUTION INTRAVENOUS at 16:02

## 2024-06-18 RX ADMIN — SODIUM CHLORIDE 500 ML: 9 INJECTION, SOLUTION INTRAVENOUS at 18:19

## 2024-06-18 RX ADMIN — IOPAMIDOL 80 ML: 755 INJECTION, SOLUTION INTRAVENOUS at 17:25

## 2024-06-18 NOTE — ED PROVIDER NOTES
Transfer of Care Note:   Physician Signing out: Mariajose  Receiving Physician: Severino Orozco MD  Sign out time: 1600      Brief history:  Patient had a 30 second syncopal episode while getting a pedicure.  No history of syncopal episodes in the past.  Patient does have history of of hypertension and was found to be borderline hypotensive here in the department with a systolic blood pressure 90/66.  Recent long distance travel.  Suspect orthostatic syncope versus PE, labs and imaging pending.  Patient getting 1 L IV fluids will need reevaluation.          Additional Assessment and results:   I have personally performed a face to face diagnostic evaluation on this patient. The patient's initial evaluation and plan have been discussed with the prior physician who initially evaluated the patient. Nursing Notes, Past Medical Hx, Past Surgical Hx, Social Hx, Allergies, vital signs and Family Hx were all reviewed.      Vitals:    06/18/24 1821   BP: (!) 112/95   Pulse: 74   Resp: 15   Temp:    SpO2: 100%     Physical Exam  General: Lying in bed, no obvious distress  HEENT:  normocephalic and atraumatic.  No scleral icterus. PERR. EOMI  Neck: Supple.  No JVD. No thyromegaly.  Lungs: clear to auscultation.  No retractions, No evidence of Respiratory Distress   Cardiac: RRR without MGR, Bilateral Radial and DP pulses 2+  Abdomen: soft.  Nontender. Nondistended, Bowel Sounds Present  Extremities:  No clubbing, cyanosis, or edema x 4. Cap Refill < 2 Seconds    Skin:  warm and dry. No rashes or bruises  Psych:  Alert and oriented x3.  Affect appropriate  Lymph:  No supraclavicular adenopathy.  Neurologic:  No focal deficit.  No seizures.      Labs Reviewed   CBC WITH AUTO DIFFERENTIAL - Abnormal; Notable for the following components:       Result Value    WBC 4.1 (*)     MCV 80.8 (*)     MCH 25.3 (*)     MCHC 31.3 (*)     RDW-CV 14.8 (*)     All other components within normal limits   D-DIMER, QUANTITATIVE - Abnormal; Notable

## 2024-06-18 NOTE — DISCHARGE INSTRUCTIONS
We suspect that your symptoms were related to your blood pressure getting low.  We recommend holding the hydrochlorothiazide medication and only taking half of your losartan.  If your blood pressure gets above 160 systolic please take the other half.  Follow-up with your cardiologist when you get back to [place] as they may want to set you up with a Holter monitor to rule out arrhythmia.  If you start having new or worsening symptoms please return to the nearest emergency department for reevaluation.

## 2024-06-18 NOTE — ED NOTES
Patient in bed and talking with family at the bedside. Patient provided education regarding current situation with pt plan of care. Patient expresses no needs at this time. Patient VSS and patient does not appear to be in any current distress at this time. Call light within reach.

## 2024-06-18 NOTE — ED PROVIDER NOTES
[facility]  EMERGENCY MEDICINE ATTENDING ATTESTATION      Evaluation of Piedad SerranoPebblesy.   Case discussed and care plan developed with resident physician.   I agree with the resident physician documentation and plan as documented by him, except if my documentation differs.   Patient seen under indirect supervision. I performed a substantive part of the MDM during the patient's ED visit. I personally made or approved the management plan.  I reviewed the medical, surgical, family and social history, medications and allergies.   I have reviewed and interpreted all available lab, radiology and ekg results available at the moment.  I have reviewed the nursing documentation.     Please see the resident physician completed note for final disposition except as documented on this attestation.   I have reviewed and interpreted all available lab, radiology and ekg results available at the moment.  Diagnosis, treatment and disposition plans were discussed and agreed upon by patient.   This transcription was electronically signed. It was dictated by use of voice recognition software and electronically transcribed. The transcription may contain errors not detected in proofreading.     I performed direct supervision and was present for the critical portion following procedures: None  Critical care time on this case: None    Electronically signed by Tommy Chavarria MD on 6/18/24 at 3:16 PM EDT        Tommy Chavarria MD  06/18/24 [id number]

## 2024-06-18 NOTE — ED NOTES
Pt to the ED via EMS. Patient presents with complaints of a syncopal episode while receiving a pedicure. Patient states that she remembers feeling like she was getting a migraine and asked the staff (her daughter) for water. Daughter states that the patient had a blank stare on her face and then passed out in which the daughter grabbed her before she fell. IV inserted and orthostatic vital signs completed. Patient is alert and oriented x 4. Respirations are regular and unlabored. Patient provided blanket. Family at the bedside and call light within reach. Dr. Billy to the bedside.

## 2024-06-18 NOTE — ED PROVIDER NOTES
[facility] EMERGENCY DEPT      EMERGENCY MEDICINE     Pt Name: Piedad Wu  MRN: [id number]  Birthdate 1965  Date of evaluation: 6/18/2024  Provider: Antelmo Billy DO  Supervising Physician: Tommy Chavarria MD    CHIEF COMPLAINT       Chief Complaint   Patient presents with    Loss of Consciousness     HISTORY OF PRESENT ILLNESS   Piedad Wu is a 58 y.o. female with a history of HTN and migraine who presents to the emergency department from home for evaluation of syncope which occurred around 2:00 PM while she was getting a pedicure.  Patient states she has gotten multiple pedicures in the past, the most recent being 1 month ago and that she has never had this issue before.  Patient states she was in a seated position when she began to experience a mild headache with some blurry vision.  She states she has had headaches like this in the past and that normally occurs when her blood pressure is elevated.  Patient had witnessed syncope by her daughter who is present in the room, states that this lasted 30 seconds to 1 minute and that it took about 2 minutes to get back to normal, she did not witness any convulsive activity.  Patient notes that 2 weeks ago she drove 9+ hours to get back here from [facility]. Pt denies weakness, numbness, diplopia, current blurry vision.     PASTMEDICAL HISTORY     Past Medical History:   Diagnosis Date    Allergic rhinitis     Anxiety     Chronic leukopenia 11/12/2020    Migraine     Root canal space perforation        Patient Active Problem List   Diagnosis Code    Essential hypertension I10    Class 1 obesity due to excess calories with serious comorbidity and body mass index (BMI) of 33.0 to 33.9 in adult E66.09, Z68.33    Chronic leukopenia D72.819     SURGICAL HISTORY       Past Surgical History:   Procedure Laterality Date    MOUTH SURGERY      2 root canals, wisdom teeth       CURRENT MEDICATIONS       Previous Medications    ACETAMINOPHEN 650 MG

## 2024-07-15 ENCOUNTER — HOSPITAL ENCOUNTER (OUTPATIENT)
Dept: WOMENS IMAGING | Age: 59
Discharge: HOME | End: 2024-07-15
Attending: FAMILY MEDICINE
Payer: COMMERCIAL

## 2024-07-15 DIAGNOSIS — Z12.31 ENCOUNTER FOR SCREENING MAMMOGRAM FOR MALIGNANT NEOPLASM OF BREAST: ICD-10-CM

## 2024-07-15 PROCEDURE — 77063 BREAST TOMOSYNTHESIS BI: CPT

## 2025-03-10 ENCOUNTER — OFFICE VISIT (OUTPATIENT)
Age: 60
End: 2025-03-10
Payer: COMMERCIAL

## 2025-03-10 VITALS
BODY MASS INDEX: 33.3 KG/M2 | WEIGHT: 207.2 LBS | DIASTOLIC BLOOD PRESSURE: 77 MMHG | HEIGHT: 66 IN | SYSTOLIC BLOOD PRESSURE: 113 MMHG | TEMPERATURE: 98.4 F | HEART RATE: 77 BPM | RESPIRATION RATE: 16 BRPM | OXYGEN SATURATION: 99 %

## 2025-03-10 DIAGNOSIS — Z23 NEED FOR SHINGLES VACCINE: ICD-10-CM

## 2025-03-10 DIAGNOSIS — Z23 PNEUMOCOCCAL VACCINATION ADMINISTERED AT CURRENT VISIT: ICD-10-CM

## 2025-03-10 DIAGNOSIS — Z28.21 INFLUENZA VACCINATION DECLINED: ICD-10-CM

## 2025-03-10 DIAGNOSIS — E66.09 CLASS 1 OBESITY DUE TO EXCESS CALORIES WITH SERIOUS COMORBIDITY AND BODY MASS INDEX (BMI) OF 33.0 TO 33.9 IN ADULT: ICD-10-CM

## 2025-03-10 DIAGNOSIS — I10 ESSENTIAL HYPERTENSION: Primary | ICD-10-CM

## 2025-03-10 DIAGNOSIS — L30.9 DERMATITIS: ICD-10-CM

## 2025-03-10 DIAGNOSIS — Z78.9 MEASLES, MUMPS, RUBELLA (MMR) VACCINATION STATUS UNKNOWN: ICD-10-CM

## 2025-03-10 DIAGNOSIS — E66.811 CLASS 1 OBESITY DUE TO EXCESS CALORIES WITH SERIOUS COMORBIDITY AND BODY MASS INDEX (BMI) OF 33.0 TO 33.9 IN ADULT: ICD-10-CM

## 2025-03-10 PROCEDURE — 90472 IMMUNIZATION ADMIN EACH ADD: CPT | Performed by: FAMILY MEDICINE

## 2025-03-10 PROCEDURE — 3078F DIAST BP <80 MM HG: CPT | Performed by: FAMILY MEDICINE

## 2025-03-10 PROCEDURE — 3074F SYST BP LT 130 MM HG: CPT | Performed by: FAMILY MEDICINE

## 2025-03-10 PROCEDURE — 99204 OFFICE O/P NEW MOD 45 MIN: CPT | Performed by: FAMILY MEDICINE

## 2025-03-10 PROCEDURE — 90677 PCV20 VACCINE IM: CPT | Performed by: FAMILY MEDICINE

## 2025-03-10 PROCEDURE — 90750 HZV VACC RECOMBINANT IM: CPT | Performed by: FAMILY MEDICINE

## 2025-03-10 PROCEDURE — 90471 IMMUNIZATION ADMIN: CPT | Performed by: FAMILY MEDICINE

## 2025-03-10 SDOH — ECONOMIC STABILITY: FOOD INSECURITY: WITHIN THE PAST 12 MONTHS, YOU WORRIED THAT YOUR FOOD WOULD RUN OUT BEFORE YOU GOT MONEY TO BUY MORE.: OFTEN TRUE

## 2025-03-10 SDOH — ECONOMIC STABILITY: FOOD INSECURITY: WITHIN THE PAST 12 MONTHS, THE FOOD YOU BOUGHT JUST DIDN'T LAST AND YOU DIDN'T HAVE MONEY TO GET MORE.: SOMETIMES TRUE

## 2025-03-10 ASSESSMENT — PATIENT HEALTH QUESTIONNAIRE - PHQ9
SUM OF ALL RESPONSES TO PHQ QUESTIONS 1-9: 0
SUM OF ALL RESPONSES TO PHQ QUESTIONS 1-9: 0
2. FEELING DOWN, DEPRESSED OR HOPELESS: NOT AT ALL
SUM OF ALL RESPONSES TO PHQ QUESTIONS 1-9: 0
1. LITTLE INTEREST OR PLEASURE IN DOING THINGS: NOT AT ALL
SUM OF ALL RESPONSES TO PHQ QUESTIONS 1-9: 0

## 2025-03-10 ASSESSMENT — LIFESTYLE VARIABLES
HOW MANY STANDARD DRINKS CONTAINING ALCOHOL DO YOU HAVE ON A TYPICAL DAY: 1 OR 2
HOW OFTEN DO YOU HAVE A DRINK CONTAINING ALCOHOL: 4 OR MORE TIMES A WEEK

## 2025-03-10 NOTE — PROGRESS NOTES
Piedad Wu (:  1965) is a 59 y.o. female,New patient, here for evaluation of the following chief complaint(s):  New Patient, Discuss Medications, and Skin Problem (On both legs )      Assessment & Plan   ASSESSMENT/PLAN:  1. Essential hypertension - chronic, stable, at goal. Currently off of losartan.  Advised to continue to hold and cont HCTZ. Awaiting routine baseline fasting labs with close clinical follow up.   -     TSH; Future  -     Albumin/Creatinine Ratio, Urine; Future  -     Basic Metabolic Panel; Future  2. Dermatitis - unclear etiology, acute, uncertain prognosis,  screen for secondary causes. Cont expectant managemnt.   -     Sedimentation Rate; Future  -     C-Reactive Protein; Future  -     Lupus Anticoagulant Comp Panel; Future  -     VALERIA Comprehensive Plus Panel; Future  3. Class 1 obesity due to excess calories with serious comorbidity and body mass index (BMI) of 33.0 to 33.9 in adult  -     TSH; Future  4. Need for shingles vaccine  -     Zoster, SHINGRIX, (18 yrs +), IM  5. Pneumococcal vaccination administered at current visit  -     Pneumococcal, PCV20, PREVNAR 20, (age 6w+), IM, PF  6. Measles, mumps, rubella (MMR) vaccination status unknown  -     Measles/Mumps/Rubella Immunity; Future  7. Influenza vaccination declined      Return in about 3 months (around 6/10/2025) for HTN with fasting labs.               Subjective   SUBJECTIVE/OBJECTIVE:  Pt presents as a new pt to [facility] care.   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  Due to current measles outbreak, pt requested MMR titers.     Additionally pt states that she has had a pruritic rash on urban lower extremities since Aug for which she has been taking allegra hives. Prior to moving, she was prescribed zyrtec which was discontinued due to dry mouth. Denies any new exposures. Other allergens include dog dander.     HTN with hx of class

## 2025-03-10 NOTE — PROGRESS NOTES
Chief Complaint   Patient presents with    New Patient    Discuss Medications    Skin Problem     On both legs      \"Have you been to the ER, urgent care clinic since your last visit?  Hospitalized since your last visit?\"    YES - When: approximately 8 months ago.  Where and Why: In [place].    “Have you seen or consulted any other health care providers outside of [facility] since your last visit?”    NO

## 2025-03-11 ENCOUNTER — RESULTS FOLLOW-UP (OUTPATIENT)
Age: 60
End: 2025-03-11

## 2025-03-11 DIAGNOSIS — R76.8 SS-A ANTIBODY POSITIVE: Primary | ICD-10-CM

## 2025-03-11 LAB
ALBUMIN/CREAT UR: 9 MG/G CREAT (ref 0–29)
BUN SERPL-MCNC: 17 MG/DL (ref 6–24)
BUN/CREAT SERPL: 18 (ref 9–23)
CALCIUM SERPL-MCNC: 9.2 MG/DL (ref 8.7–10.2)
CENTROMERE B AB SER-ACNC: <0.2 AI (ref 0–0.9)
CHLORIDE SERPL-SCNC: 102 MMOL/L (ref 96–106)
CHROMATIN AB SERPL-ACNC: 4.8 AI (ref 0–0.9)
CO2 SERPL-SCNC: 20 MMOL/L (ref 20–29)
CREAT SERPL-MCNC: 0.92 MG/DL (ref 0.57–1)
CREAT UR-MCNC: 162.4 MG/DL
CRP SERPL-MCNC: 4 MG/L (ref 0–10)
DSDNA AB SER-ACNC: 1 IU/ML (ref 0–9)
ENA JO1 AB SER-ACNC: <0.2 AI (ref 0–0.9)
ENA RNP AB SER-ACNC: 0.4 AI (ref 0–0.9)
ENA SCL70 AB SER-ACNC: <0.2 AI (ref 0–0.9)
ENA SM AB SER-ACNC: <0.2 AI (ref 0–0.9)
ENA SM+RNP AB SER-ACNC: <0.2 AI (ref 0–0.9)
ENA SS-A AB SER-ACNC: >8 AI (ref 0–0.9)
ENA SS-B AB SER-ACNC: <0.2 AI (ref 0–0.9)
ERYTHROCYTE [SEDIMENTATION RATE] IN BLOOD BY WESTERGREN METHOD: 56 MM/HR (ref 0–40)
GLUCOSE SERPL-MCNC: 84 MG/DL (ref 70–99)
Lab: ABNORMAL
MEV IGG SER IA-ACNC: 53.5 AU/ML
MICROALBUMIN UR-MCNC: 14.6 UG/ML
MUV IGG SER IA-ACNC: >300 AU/ML
POTASSIUM SERPL-SCNC: 4 MMOL/L (ref 3.5–5.2)
RIBOSOMAL P AB SER-ACNC: <0.2 AI (ref 0–0.9)
RUBV IGG SERPL IA-ACNC: >33 INDEX
SODIUM SERPL-SCNC: 139 MMOL/L (ref 134–144)
TSH SERPL DL<=0.005 MIU/L-ACNC: 2.33 UIU/ML (ref 0.45–4.5)

## 2025-03-11 ASSESSMENT — ENCOUNTER SYMPTOMS
GASTROINTESTINAL NEGATIVE: 1
EYES NEGATIVE: 1
SHORTNESS OF BREATH: 0
COLOR CHANGE: 0
COUGH: 0
RESPIRATORY NEGATIVE: 1

## 2025-03-11 NOTE — RESULT ENCOUNTER NOTE
Call:  all of your labs are within acceptable limits except they are concerning for lupus.  Your inflammation markers are elevated and the markers for lupus are positive as well.  A referral is being placed to rheumatology to discuss further.  Call their office to schedule an appt. Keep your routinely scheduled appts. Have a great rest of the week.  Welcome to [place].  It was a pleasure to meet you!

## 2025-03-12 LAB
APTT SCREEN TO CONFIRM RATIO: 1.24 RATIO (ref 0–1.34)
CONFIRM APTT/NORMAL: 32.9 SEC (ref 0–47.6)
LA 2 SCREEN W REFLEX-IMP: NORMAL
SCREEN APTT: 33.2 SEC (ref 0–43.5)
SCREEN DRVVT: 35.4 SEC (ref 0–47)
THROMBIN TIME: 18.7 SEC (ref 0–23)

## 2025-03-12 NOTE — RESULT ENCOUNTER NOTE
Letter:  you are immune for the measles and no booster is needed. The urine protein is within normal limits.  This can be checked yearly. Continue with the aforementioned recommendations.  Have a great rest of the week.

## 2025-03-12 NOTE — RESULT ENCOUNTER NOTE
Letter:  the lupus anticoagulant panel was negative.  Follow up with rheumatology to discuss the other elevated labs and further evaluation.  Have a great week.

## 2025-05-05 ENCOUNTER — TELEPHONE (OUTPATIENT)
Age: 60
End: 2025-05-05